# Patient Record
Sex: MALE | Race: WHITE | NOT HISPANIC OR LATINO | Employment: FULL TIME | URBAN - METROPOLITAN AREA
[De-identification: names, ages, dates, MRNs, and addresses within clinical notes are randomized per-mention and may not be internally consistent; named-entity substitution may affect disease eponyms.]

---

## 2018-04-17 ENCOUNTER — OFFICE VISIT (OUTPATIENT)
Dept: URGENT CARE | Facility: CLINIC | Age: 33
End: 2018-04-17
Payer: COMMERCIAL

## 2018-04-17 VITALS
WEIGHT: 196 LBS | OXYGEN SATURATION: 96 % | DIASTOLIC BLOOD PRESSURE: 70 MMHG | SYSTOLIC BLOOD PRESSURE: 110 MMHG | RESPIRATION RATE: 14 BRPM | BODY MASS INDEX: 28.06 KG/M2 | HEIGHT: 70 IN | TEMPERATURE: 98.4 F | HEART RATE: 85 BPM

## 2018-04-17 DIAGNOSIS — J98.8 RTI (RESPIRATORY TRACT INFECTION): ICD-10-CM

## 2018-04-17 DIAGNOSIS — J03.90 EXUDATIVE TONSILLITIS: ICD-10-CM

## 2018-04-17 PROCEDURE — 99204 OFFICE O/P NEW MOD 45 MIN: CPT | Performed by: FAMILY MEDICINE

## 2018-04-17 RX ORDER — DEXAMETHASONE SODIUM PHOSPHATE 4 MG/ML
8 INJECTION, SOLUTION INTRA-ARTICULAR; INTRALESIONAL; INTRAMUSCULAR; INTRAVENOUS; SOFT TISSUE ONCE
Status: COMPLETED | OUTPATIENT
Start: 2018-04-17 | End: 2018-04-17

## 2018-04-17 RX ORDER — CEFDINIR 300 MG/1
300 CAPSULE ORAL EVERY 12 HOURS
Qty: 14 CAP | Refills: 0 | Status: SHIPPED | OUTPATIENT
Start: 2018-04-17 | End: 2018-04-24

## 2018-04-17 RX ORDER — PROMETHAZINE HYDROCHLORIDE AND PHENYLEPHRINE HYDROCHLORIDE 6.25; 5 MG/5ML; MG/5ML
SYRUP ORAL
Qty: 120 ML | Refills: 1 | Status: SHIPPED | OUTPATIENT
Start: 2018-04-17 | End: 2019-06-18

## 2018-04-17 RX ORDER — IBUPROFEN 800 MG/1
800 TABLET ORAL EVERY 8 HOURS PRN
Qty: 20 TAB | Refills: 3 | Status: SHIPPED | OUTPATIENT
Start: 2018-04-17 | End: 2019-06-18

## 2018-04-17 RX ADMIN — DEXAMETHASONE SODIUM PHOSPHATE 8 MG: 4 INJECTION, SOLUTION INTRA-ARTICULAR; INTRALESIONAL; INTRAMUSCULAR; INTRAVENOUS; SOFT TISSUE at 18:33

## 2018-04-17 ASSESSMENT — ENCOUNTER SYMPTOMS
DIZZINESS: 0
ORTHOPNEA: 0
FEVER: 0
SORE THROAT: 1
FOCAL WEAKNESS: 0
HEMOPTYSIS: 0
SHORTNESS OF BREATH: 0
CHILLS: 0
COUGH: 1

## 2018-04-17 ASSESSMENT — PATIENT HEALTH QUESTIONNAIRE - PHQ9: CLINICAL INTERPRETATION OF PHQ2 SCORE: 0

## 2018-04-17 NOTE — LETTER
April 17, 2018         Patient: Dwayne Jackson   YOB: 1985   Date of Visit: 4/17/2018           To Whom it May Concern:    Dwayne Jackson was seen in my clinic on 4/17/2018. He may return to work in 2-3 days.    If you have any questions or concerns, please don't hesitate to call.        Sincerely,           Kota Estrada M.D.  Electronically Signed

## 2018-04-18 NOTE — PROGRESS NOTES
"Subjective:      Dwayne Jackson is a 32 y.o. male who presents with Pharyngitis; Fever; and Cough (x2 weeks)    Chief Complaint   Patient presents with   • Pharyngitis   • Fever   • Cough     x2 weeks        - This is a very pleasant 32 y.o. male with complaints of cough/sinus and sore throat x 2wks, no NVFC           ALLERGIES:  Patient has no allergy information on record.     PMH:  History reviewed. No pertinent past medical history.     MEDS:    Current Outpatient Prescriptions:   •  cefdinir (OMNICEF) 300 MG Cap, Take 1 Cap by mouth every 12 hours for 7 days., Disp: 14 Cap, Rfl: 0  •  Promethazine-Phenylephrine 6.25-5 MG/5ML Syrup, 5ml q8hrd prn, Disp: 120 mL, Rfl: 1  •  ibuprofen (MOTRIN) 800 MG Tab, Take 1 Tab by mouth every 8 hours as needed., Disp: 20 Tab, Rfl: 3    Current Facility-Administered Medications:   •  dexamethasone (DECADRON) injection 8 mg, 8 mg, Intramuscular, Once, Kota Estrada M.D.    ** I have documented what I find to be significant in regards to past medical, social, family and surgical history  in my HPI or under PMH/PSH/FH review section, otherwise it is contributory **             HPI    Review of Systems   Constitutional: Negative for chills and fever.   HENT: Positive for congestion and sore throat.    Respiratory: Positive for cough. Negative for hemoptysis and shortness of breath.    Cardiovascular: Negative for chest pain and orthopnea.   Neurological: Negative for dizziness and focal weakness.          Objective:     /70   Pulse 85   Temp 36.9 °C (98.4 °F)   Resp 14   Ht 1.778 m (5' 10\")   Wt 88.9 kg (196 lb)   SpO2 96%   BMI 28.12 kg/m²      Physical Exam   Constitutional: He appears well-developed. No distress.   HENT:   Head: Normocephalic and atraumatic.   Mouth/Throat: Oropharyngeal exudate present.   Eyes: Conjunctivae are normal.   Neck: Neck supple.   Cardiovascular: Regular rhythm.    No murmur heard.  Pulmonary/Chest: Effort normal and breath " sounds normal. No respiratory distress.   Lymphadenopathy:     He has cervical adenopathy.   Neurological: He is alert. He exhibits normal muscle tone.   Skin: Skin is warm and dry.   Psychiatric: He has a normal mood and affect. Judgment normal.   Nursing note and vitals reviewed.              Assessment/Plan:         1. RTI (respiratory tract infection)  Promethazine-Phenylephrine 6.25-5 MG/5ML Syrup   2. Exudative tonsillitis  dexamethasone (DECADRON) injection 8 mg    cefdinir (OMNICEF) 300 MG Cap    ibuprofen (MOTRIN) 800 MG Tab             Dx & d/c instructions discussed w/ patient and/or family members. Follow up w/ Prvt Dr or here in 3-4 days if not getting better, sooner if needed,  ER if worse and UC/PCP unavailable.        Possible side effects (i.e. Rash, GI upset/constipation, sedation, elevation of BP or sugars) of any medications given discussed.

## 2019-06-18 ENCOUNTER — OFFICE VISIT (OUTPATIENT)
Dept: URGENT CARE | Facility: CLINIC | Age: 34
End: 2019-06-18
Payer: COMMERCIAL

## 2019-06-18 ENCOUNTER — HOSPITAL ENCOUNTER (OUTPATIENT)
Facility: MEDICAL CENTER | Age: 34
End: 2019-06-18
Attending: FAMILY MEDICINE
Payer: COMMERCIAL

## 2019-06-18 VITALS
SYSTOLIC BLOOD PRESSURE: 118 MMHG | HEIGHT: 68 IN | HEART RATE: 78 BPM | TEMPERATURE: 97.6 F | WEIGHT: 186 LBS | BODY MASS INDEX: 28.19 KG/M2 | OXYGEN SATURATION: 96 % | DIASTOLIC BLOOD PRESSURE: 80 MMHG | RESPIRATION RATE: 18 BRPM

## 2019-06-18 DIAGNOSIS — N34.2 URETHRITIS: ICD-10-CM

## 2019-06-18 PROCEDURE — 96372 THER/PROPH/DIAG INJ SC/IM: CPT | Performed by: FAMILY MEDICINE

## 2019-06-18 PROCEDURE — 99214 OFFICE O/P EST MOD 30 MIN: CPT | Mod: 25 | Performed by: FAMILY MEDICINE

## 2019-06-18 PROCEDURE — 87491 CHLMYD TRACH DNA AMP PROBE: CPT

## 2019-06-18 PROCEDURE — 87591 N.GONORRHOEAE DNA AMP PROB: CPT

## 2019-06-18 RX ORDER — PROMETHAZINE HYDROCHLORIDE AND PHENYLEPHRINE HYDROCHLORIDE 6.25; 5 MG/5ML; MG/5ML
SYRUP ORAL
Qty: 120 ML | Refills: 1 | Status: CANCELLED | OUTPATIENT
Start: 2019-06-18

## 2019-06-18 RX ORDER — IBUPROFEN 800 MG/1
800 TABLET ORAL EVERY 8 HOURS PRN
Qty: 20 TAB | Refills: 3 | Status: CANCELLED | OUTPATIENT
Start: 2019-06-18

## 2019-06-18 RX ORDER — AZITHROMYCIN 500 MG/1
1000 TABLET, FILM COATED ORAL ONCE
Qty: 2 TAB | Refills: 0 | Status: SHIPPED | OUTPATIENT
Start: 2019-06-18 | End: 2019-06-18

## 2019-06-19 DIAGNOSIS — N34.2 URETHRITIS: ICD-10-CM

## 2019-06-19 LAB
C TRACH DNA SPEC QL NAA+PROBE: NEGATIVE
N GONORRHOEA DNA SPEC QL NAA+PROBE: NEGATIVE
SPECIMEN SOURCE: NORMAL

## 2019-06-19 NOTE — PROGRESS NOTES
"Subjective:      Dwayne Jackson is a 33 y.o. male who presents with Exposure to STD      - This is a pleasant and non toxic appearing 33 y.o. male with c/o slight dysuria x 1 wk. No penile DC.           ALLERGIES:  Patient has no known allergies.     PMH:  History reviewed. No pertinent past medical history.     PSH:  History reviewed. No pertinent surgical history.    MEDS:    Current Outpatient Prescriptions:   •  azithromycin (ZITHROMAX) 500 MG tablet, Take 2 Tabs by mouth Once for 1 dose., Disp: 2 Tab, Rfl: 0    Current Facility-Administered Medications:   •  cefTRIAXone (ROCEPHIN) 500 mg, lidocaine (XYLOCAINE) 1 % 1.8 mL for IM use, 500 mg, Intramuscular, Once, Kota Estrada M.D.    ** I have documented what I find to be significant in regards to past medical, social, family and surgical history  in my HPI or under PMH/PSH/FH review section, otherwise it is contributory **           HPI    Review of Systems   Musculoskeletal: Positive for joint pain.   All other systems reviewed and are negative.         Objective:     /80   Pulse 78   Temp 36.4 °C (97.6 °F) (Temporal)   Resp 18   Ht 1.727 m (5' 8\")   Wt 84.4 kg (186 lb)   SpO2 96%   BMI 28.28 kg/m²      Physical Exam   Constitutional: He appears well-developed. No distress.   HENT:   Head: Normocephalic and atraumatic.   Cardiovascular: Regular rhythm.    No murmur heard.  Pulmonary/Chest: Effort normal. No respiratory distress.   Neurological: He is alert. He exhibits normal muscle tone.   Skin: Skin is warm and dry.   Psychiatric: He has a normal mood and affect. Judgment normal.   Nursing note and vitals reviewed.              Assessment/Plan:         1. Urethritis  cefTRIAXone (ROCEPHIN) 500 mg, lidocaine (XYLOCAINE) 1 % 1.8 mL for IM use    azithromycin (ZITHROMAX) 500 MG tablet    CHLAMYDIA/GC PCR URINE OR SWAB             Dx & d/c instructions discussed w/ patient and/or family members.     Follow up with PCP (or here if PCP " unavailable) in 2-3 days if symptoms not improving, ER if feeling/getting worse.    Any realistic and/or common medication side effects that may have been given today(i.e. Rash, GI upset/constipation, sedation, elevation of BP or blood sugars) reviewed.     Patient left in stable condition

## 2021-04-12 ENCOUNTER — NURSE TRIAGE (OUTPATIENT)
Dept: HEALTH INFORMATION MANAGEMENT | Facility: OTHER | Age: 36
End: 2021-04-12

## 2021-04-12 NOTE — TELEPHONE ENCOUNTER
Regarding: Next course of action / establish with Alena Barillas   ----- Message from Rhonda Ahn sent at 4/12/2021  2:16 PM PDT -----  Dizziness with nausea post vaccination. Spouse Simona Driscoll does not have access to chart     NP / PRIOR PCP NONE / NO PAIN MEDS / DIZZY SPELLS WITH NAUSEA / POSSIBLE HIGH BLOOD PRESSURE / ANTHEM   [phone]

## 2021-04-12 NOTE — TELEPHONE ENCOUNTER
"Pt has had dizziness for a couple of months now but has been increasing in frequency and severity for the last week. Pt feels as though he is spinning and most of the time needs to hold on to something to be able to walk. Advised UC. Appt scheduled. Scheduled NP appt for PCP as well.    Reason for Disposition  • SEVERE dizziness (e.g., unable to stand, requires support to walk, feels like passing out now)    Additional Information  • Negative: Shock suspected (e.g., cold/pale/clammy skin, too weak to stand, low BP, rapid pulse)  • Negative: Difficult to awaken or acting confused (e.g., disoriented, slurred speech)  • Negative: Fainted, and still feels dizzy afterwards  • Negative: SEVERE difficulty breathing (e.g., struggling for each breath, speaks in single words)  • Negative: Overdose (accidental or intentional) of medications  • Negative: New neurologic deficit that is present now: * Weakness of the face, arm, or leg on one side of the body * Numbness of the face, arm, or leg on one side of the body * Loss of speech or garbled speech  • Negative: Heart beating < 50 beats per minute OR > 140 beats per minute  • Negative: Sounds like a life-threatening emergency to the triager  • Negative: Chest pain  • Negative: Rectal bleeding, bloody stool, or tarry-black stool  • Negative: Vomiting is the main symptom  • Negative: Diarrhea is the main symptom  • Negative: Headache is the main symptom  • Negative: Heat exhaustion suspected (i.e., dehydration from heat exposure)  • Negative: Patient states that he/she is having an anxiety/panic attack    Answer Assessment - Initial Assessment Questions  1. DESCRIPTION: \"Describe your dizziness.\"      Dizziness, when you are drunk and have the spins  2. LIGHTHEADED: \"Do you feel lightheaded?\" (e.g., somewhat faint, woozy, weak upon standing)      spinning  3. VERTIGO: \"Do you feel like either you or the room is spinning or tilting?\" (i.e. vertigo)      yes  4. SEVERITY: \"How bad " "is it?\"  \"Do you feel like you are going to faint?\" \"Can you stand and walk?\"    - MILD - walking normally    - MODERATE - interferes with normal activities (e.g., work, school)     - SEVERE - unable to stand, requires support to walk, feels like passing out now.       Moderate to severe  5. ONSET:  \"When did the dizziness begin?\"      Two months  6. AGGRAVATING FACTORS: \"Does anything make it worse?\" (e.g., standing, change in head position)      unknown  7. HEART RATE: \"Can you tell me your heart rate?\" \"How many beats in 15 seconds?\"  (Note: not all patients can do this)        no  8. CAUSE: \"What do you think is causing the dizziness?\"      unknown  9. RECURRENT SYMPTOM: \"Have you had dizziness before?\" If so, ask: \"When was the last time?\" \"What happened that time?\"      no  10. OTHER SYMPTOMS: \"Do you have any other symptoms?\" (e.g., fever, chest pain, vomiting, diarrhea, bleeding)        Nausea with these episodes  11. PREGNANCY: \"Is there any chance you are pregnant?\" \"When was your last menstrual period?\"        no    Protocols used: DIZZINESS-A-OH    "

## 2021-04-13 ENCOUNTER — APPOINTMENT (OUTPATIENT)
Dept: RADIOLOGY | Facility: MEDICAL CENTER | Age: 36
End: 2021-04-13
Attending: EMERGENCY MEDICINE
Payer: COMMERCIAL

## 2021-04-13 ENCOUNTER — HOSPITAL ENCOUNTER (EMERGENCY)
Facility: MEDICAL CENTER | Age: 36
End: 2021-04-13
Attending: EMERGENCY MEDICINE
Payer: COMMERCIAL

## 2021-04-13 ENCOUNTER — OFFICE VISIT (OUTPATIENT)
Dept: URGENT CARE | Facility: CLINIC | Age: 36
End: 2021-04-13
Payer: COMMERCIAL

## 2021-04-13 VITALS
DIASTOLIC BLOOD PRESSURE: 60 MMHG | HEIGHT: 70 IN | RESPIRATION RATE: 16 BRPM | TEMPERATURE: 98 F | SYSTOLIC BLOOD PRESSURE: 108 MMHG | OXYGEN SATURATION: 94 % | BODY MASS INDEX: 30.06 KG/M2 | HEART RATE: 90 BPM | WEIGHT: 210 LBS

## 2021-04-13 VITALS
BODY MASS INDEX: 30.52 KG/M2 | SYSTOLIC BLOOD PRESSURE: 112 MMHG | OXYGEN SATURATION: 96 % | DIASTOLIC BLOOD PRESSURE: 62 MMHG | HEART RATE: 71 BPM | RESPIRATION RATE: 15 BRPM | TEMPERATURE: 97 F | WEIGHT: 213.19 LBS | HEIGHT: 70 IN

## 2021-04-13 DIAGNOSIS — R42 DIZZINESS: ICD-10-CM

## 2021-04-13 DIAGNOSIS — R42 VERTIGO: ICD-10-CM

## 2021-04-13 DIAGNOSIS — H53.9 VISION DISTURBANCE: ICD-10-CM

## 2021-04-13 DIAGNOSIS — J34.9 SINUS DISEASE: ICD-10-CM

## 2021-04-13 DIAGNOSIS — Z87.898 HISTORY OF CHEST PAIN: ICD-10-CM

## 2021-04-13 DIAGNOSIS — R26.89 IMBALANCE: ICD-10-CM

## 2021-04-13 LAB
ALBUMIN SERPL BCP-MCNC: 4.6 G/DL (ref 3.2–4.9)
ALBUMIN/GLOB SERPL: 1.4 G/DL
ALP SERPL-CCNC: 87 U/L (ref 30–99)
ALT SERPL-CCNC: 105 U/L (ref 2–50)
ANION GAP SERPL CALC-SCNC: 13 MMOL/L (ref 7–16)
AST SERPL-CCNC: 51 U/L (ref 12–45)
BASOPHILS # BLD AUTO: 0.4 % (ref 0–1.8)
BASOPHILS # BLD: 0.03 K/UL (ref 0–0.12)
BILIRUB SERPL-MCNC: 0.5 MG/DL (ref 0.1–1.5)
BUN SERPL-MCNC: 15 MG/DL (ref 8–22)
CALCIUM SERPL-MCNC: 9.7 MG/DL (ref 8.4–10.2)
CHLORIDE SERPL-SCNC: 102 MMOL/L (ref 96–112)
CO2 SERPL-SCNC: 22 MMOL/L (ref 20–33)
CREAT SERPL-MCNC: 0.72 MG/DL (ref 0.5–1.4)
EKG IMPRESSION: NORMAL
EOSINOPHIL # BLD AUTO: 0.08 K/UL (ref 0–0.51)
EOSINOPHIL NFR BLD: 1 % (ref 0–6.9)
ERYTHROCYTE [DISTWIDTH] IN BLOOD BY AUTOMATED COUNT: 38.4 FL (ref 35.9–50)
GLOBULIN SER CALC-MCNC: 3.2 G/DL (ref 1.9–3.5)
GLUCOSE SERPL-MCNC: 132 MG/DL (ref 65–99)
HCT VFR BLD AUTO: 48.5 % (ref 42–52)
HGB BLD-MCNC: 17.2 G/DL (ref 14–18)
IMM GRANULOCYTES # BLD AUTO: 0.03 K/UL (ref 0–0.11)
IMM GRANULOCYTES NFR BLD AUTO: 0.4 % (ref 0–0.9)
LYMPHOCYTES # BLD AUTO: 2.42 K/UL (ref 1–4.8)
LYMPHOCYTES NFR BLD: 30.7 % (ref 22–41)
MCH RBC QN AUTO: 31.7 PG (ref 27–33)
MCHC RBC AUTO-ENTMCNC: 35.5 G/DL (ref 33.7–35.3)
MCV RBC AUTO: 89.3 FL (ref 81.4–97.8)
MONOCYTES # BLD AUTO: 0.5 K/UL (ref 0–0.85)
MONOCYTES NFR BLD AUTO: 6.4 % (ref 0–13.4)
NEUTROPHILS # BLD AUTO: 4.81 K/UL (ref 1.82–7.42)
NEUTROPHILS NFR BLD: 61.1 % (ref 44–72)
NRBC # BLD AUTO: 0 K/UL
NRBC BLD-RTO: 0 /100 WBC
PLATELET # BLD AUTO: 274 K/UL (ref 164–446)
PMV BLD AUTO: 8.7 FL (ref 9–12.9)
POTASSIUM SERPL-SCNC: 3.9 MMOL/L (ref 3.6–5.5)
PROT SERPL-MCNC: 7.8 G/DL (ref 6–8.2)
RBC # BLD AUTO: 5.43 M/UL (ref 4.7–6.1)
SODIUM SERPL-SCNC: 137 MMOL/L (ref 135–145)
TROPONIN T SERPL-MCNC: <6 NG/L (ref 6–19)
WBC # BLD AUTO: 7.9 K/UL (ref 4.8–10.8)

## 2021-04-13 PROCEDURE — 80053 COMPREHEN METABOLIC PANEL: CPT

## 2021-04-13 PROCEDURE — 99284 EMERGENCY DEPT VISIT MOD MDM: CPT

## 2021-04-13 PROCEDURE — 85025 COMPLETE CBC W/AUTO DIFF WBC: CPT

## 2021-04-13 PROCEDURE — 70498 CT ANGIOGRAPHY NECK: CPT

## 2021-04-13 PROCEDURE — 700117 HCHG RX CONTRAST REV CODE 255: Performed by: EMERGENCY MEDICINE

## 2021-04-13 PROCEDURE — 84484 ASSAY OF TROPONIN QUANT: CPT

## 2021-04-13 PROCEDURE — 99215 OFFICE O/P EST HI 40 MIN: CPT | Performed by: PHYSICIAN ASSISTANT

## 2021-04-13 PROCEDURE — 71045 X-RAY EXAM CHEST 1 VIEW: CPT

## 2021-04-13 PROCEDURE — 93005 ELECTROCARDIOGRAM TRACING: CPT | Performed by: EMERGENCY MEDICINE

## 2021-04-13 PROCEDURE — 70496 CT ANGIOGRAPHY HEAD: CPT

## 2021-04-13 RX ORDER — MECLIZINE HYDROCHLORIDE 25 MG/1
25 TABLET ORAL 3 TIMES DAILY PRN
Qty: 30 TABLET | Refills: 0 | Status: SHIPPED | OUTPATIENT
Start: 2021-04-13 | End: 2021-05-11

## 2021-04-13 RX ORDER — IBUPROFEN 200 MG
400-600 TABLET ORAL EVERY 6 HOURS PRN
COMMUNITY

## 2021-04-13 RX ORDER — FEXOFENADINE HCL AND PSEUDOEPHEDRINE HCI 60; 120 MG/1; MG/1
1 TABLET, EXTENDED RELEASE ORAL DAILY
Qty: 10 TABLET | Refills: 0 | Status: SHIPPED | OUTPATIENT
Start: 2021-04-13 | End: 2021-04-27

## 2021-04-13 RX ORDER — ASPIRIN 325 MG
650 TABLET ORAL EVERY 6 HOURS PRN
Status: SHIPPED | COMMUNITY
End: 2022-07-06

## 2021-04-13 RX ADMIN — IOHEXOL 100 ML: 350 INJECTION, SOLUTION INTRAVENOUS at 11:03

## 2021-04-13 ASSESSMENT — ENCOUNTER SYMPTOMS
EYE DISCHARGE: 0
WHEEZING: 0
NECK PAIN: 1
VOMITING: 1
EYE REDNESS: 0
COUGH: 0
VISUAL CHANGE: 1
CHANGE IN BOWEL HABIT: 0
FEVER: 0
DIZZINESS: 1
NAUSEA: 1
FATIGUE: 1
BLURRED VISION: 1
SENSORY CHANGE: 1
ABDOMINAL PAIN: 0
TINGLING: 1

## 2021-04-13 NOTE — PROGRESS NOTES
"Subjective:      Dwayne Jackson is a 35 y.o. male who presents with Dizziness (HBP, numbness on Lt hand:fingertips, legs and face, hard time retaining information, dizziness started 2 weks after Covid vaccine: 01/26/21)            Patient is a 35-year-old male who presents to urgent care with intermittent episodes of room spinning, difficulty walking, numbness and tingling into the fingertips, face since beginning of February.  Patient has gradually become more concerned as his episodes of room spinning have progressively gotten worse in particular over the last week now patient is having to hold onto objects to walk as he feels unstable.  He does report 1-2 severe episodes throughout the day where he feels associated nausea and lightheadedness almost as though he is going to pass out.  During some of these episodes he does have numbness and tingling into his face and hands.  Also as such he reports \"tunnel vision\" and will often have associated visual changes for several minutes.  He is uncertain various triggers as he does report that getting stressed for activity worsen symptoms however he will even have episode while lying still in bed.  Finally patient does report intermittent episodes of chest pain to the left side of the chest sometimes radiation to the left side of the neck-he does report stress will trigger this.  He denies any current chest pain at this time although does report intermittent episodes over the last few weeks.  Denies specific headache and is uncertain if he has 1 with these episodes.  Patient is with his wife today who also provides history.    Dizziness  This is a new problem. The current episode started more than 1 month ago. The problem occurs daily. The problem has been waxing and waning. Associated symptoms include chest pain, fatigue, nausea, neck pain, a visual change and vomiting. Pertinent negatives include no abdominal pain, change in bowel habit, coughing or fever. " "Exacerbated by: As above.  Treatments tried: ASA. The treatment provided mild relief.       Review of Systems   Constitutional: Positive for fatigue and malaise/fatigue. Negative for fever.   HENT: Positive for tinnitus.    Eyes: Positive for blurred vision. Negative for discharge and redness.   Respiratory: Negative for cough and wheezing.    Cardiovascular: Positive for chest pain.   Gastrointestinal: Positive for nausea and vomiting. Negative for abdominal pain and change in bowel habit.   Genitourinary: Negative for dysuria.   Musculoskeletal: Positive for neck pain.   Neurological: Positive for dizziness, tingling and sensory change.   All other systems reviewed and are negative.         Objective:     /60 (BP Location: Left arm, Patient Position: Sitting, BP Cuff Size: Adult long)   Pulse 90   Temp 36.7 °C (98 °F) (Temporal)   Resp 16   Ht 1.778 m (5' 10\")   Wt 95.3 kg (210 lb)   SpO2 94%   BMI 30.13 kg/m²    PMH:  has no past medical history on file.  MEDS: Reviewed .   ALLERGIES: No Known Allergies  SURGHX: History reviewed. No pertinent surgical history.  SOCHX:  reports that he has never smoked. He has never used smokeless tobacco. He reports current alcohol use. He reports current drug use. Drug: Marijuana.  FH: Family history was reviewed, no pertinent findings to report    Physical Exam  Vitals reviewed.   Constitutional:       General: He is not in acute distress.     Appearance: He is well-developed.   HENT:      Head: Normocephalic and atraumatic.      Right Ear: External ear normal.      Left Ear: External ear normal.      Mouth/Throat:      Pharynx: No oropharyngeal exudate.   Eyes:      General: No visual field deficit.     Conjunctiva/sclera: Conjunctivae normal.      Pupils: Pupils are equal, round, and reactive to light.   Neck:      Trachea: No tracheal deviation.   Cardiovascular:      Rate and Rhythm: Normal rate and regular rhythm.      Heart sounds: No murmur.   Pulmonary:     "  Effort: Pulmonary effort is normal. No respiratory distress.      Breath sounds: Normal breath sounds.   Musculoskeletal:         General: Normal range of motion.      Cervical back: Normal range of motion and neck supple.   Skin:     General: Skin is warm.      Findings: No rash.   Neurological:      General: No focal deficit present.      Mental Status: He is alert and oriented to person, place, and time.      GCS: GCS eye subscore is 4. GCS verbal subscore is 5. GCS motor subscore is 6.      Cranial Nerves: No cranial nerve deficit.      Gait: Gait abnormal.      Comments: Slow finger to nose.   Unsteady with tandem walk.    Psychiatric:         Behavior: Behavior normal.         Thought Content: Thought content normal.         Judgment: Judgment normal.                 Assessment/Plan:        1. Imbalance  2. Vertigo  3. History of chest pain  4. Vision disturbance    Patient has notable red flag symptoms with noted imbalance on exam unable to walk straight line and is unsteady with Romberg's.  Patient is having episodes with activity but also at rest does not appear to be consistent with positional vertigo.  Furthermore patient has had a sensation changes along with visual disturbances.  Also intermittent episodes of chest pain.  Both symptoms have been going on for several weeks I do feel that patient needs higher level of care and prompt work-up at this time.  Patient is agreeable to go to the emergency room (uncertain which emergency room he will present to however is agreeable to go) with his wife who will drive the patient for further evaluation and work-up.  Patient was stable throughout the duration of his care-patient currently denies any chest pain.    Please note that this dictation was created using voice recognition software. I have made every reasonable attempt to correct obvious errors, but I expect that there are errors of grammar and possibly content that I did not discover before finalizing  the note.

## 2021-04-13 NOTE — ED NOTES
Discharge instructions provided. PIV removed.  Pt verbalized the understanding of discharge instructions to follow up with PCP and to return to ER if condition worsens.  Pt ambulated out of ER without difficulty.

## 2021-04-13 NOTE — ED TRIAGE NOTES
"Chief Complaint   Patient presents with   • Dizziness     started couple months ago after second Covid Vaccine, reports is now \"everyday\"   • Neck Pain     \"has been going on for a while\"     /86   Pulse 80   Temp 36.7 °C (98 °F) (Temporal)   Resp 15   Ht 1.778 m (5' 10\")   Wt 96.7 kg (213 lb 3 oz)   SpO2 96%   BMI 30.59 kg/m²     Pt ambulated to ED w/ family member, c/o intermittent dizziness for several months, reports has been getting progressively more frequent over the last couple of days.  Pt states has been working \"nonstop\".  Pt states also has intermittent neck pain separate from the dizziness.    Pt denies c/o CP, abd pain or back pain.    Covid Screen Negative.  Pt has received Covid Vaccines x 2.   "

## 2021-04-13 NOTE — ED NOTES
Med rec updated and complete  Allergies reviewed  Interviewed pt with wife at bedside with permission from pt.  Pt reports no prescription medications.  Pt reports no antibiotics in the last 2 weeks

## 2021-04-13 NOTE — ED PROVIDER NOTES
"ED Provider Note    ED Provider Note    Primary care provider: MARTI Sy  Means of arrival: Walk-in  History obtained from: Patient    CHIEF COMPLAINT  Chief Complaint   Patient presents with   • Dizziness     started couple months ago after second Covid Vaccine, reports is now \"everyday\"   • Neck Pain     \"has been going on for a while\"     Seen at 9:56 AM.   HPI  Dwayne Jackson is a 35 y.o. male who presents to the Emergency Department for dizziness and vertigo. For the past few months he has had intermittent sensation that the room is spinning, but he is off balance, he also reports intermittent tunnel vision, these occur sometimes when he is walking or going upstairs. They do not appear to be modified with head movement. He feels that they have progressively getting worse. 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    On review of systems he notes intermittent blurred vision, intermittent headaches, left-sided neck pain, intermittent ringing of the ears, intermittent chest pain intermittent shortness of breath intermittent dyspnea on exertion, intermittent tingling of the fingers feet and face. He also endorses some anxiety and stress.    REVIEW OF SYSTEMS  See HPI,   Remainder of ROS negative.     PAST MEDICAL HISTORY   has a past medical history of Patient denies medical problems.    SURGICAL HISTORY   has a past surgical history that includes hernia repair.    SOCIAL HISTORY  Social History     Tobacco Use   • Smoking status: Never Smoker   • Smokeless tobacco: Never Used   Substance Use Topics   • Alcohol use: Yes   • Drug use: Yes     Types: Marijuana     Comment: Marijuana      Social History     Substance and Sexual Activity   Drug Use Yes   • Types: Marijuana    Comment: Marijuana       FAMILY HISTORY  History reviewed. No pertinent family history.    CURRENT MEDICATIONS  Reviewed.  See Encounter " "Summary.     ALLERGIES  Allergies   Allergen Reactions   • Other Food Itching     Mushrooms, pt reports that he gets itchy        PHYSICAL EXAM  VITAL SIGNS: /62   Pulse 71   Temp 36.1 °C (97 °F) (Temporal)   Resp 15   Ht 1.778 m (5' 10\")   Wt 96.7 kg (213 lb 3 oz)   SpO2 96%   BMI 30.59 kg/m²   Constitutional: Awake, alert in no apparent distress.  HENT: Normocephalic, Bilateral external ears normal. Nose normal. No mastoid tenderness, tympanic membranes normal.  Eyes: Conjunctiva normal, non-icteric, EOMI.    Thorax & Lungs: Easy unlabored respirations, Clear to ascultation bilaterally.  Cardiovascular: Regular rate, Regular rhythm, No murmurs, rubs or gallops. Bilateral pulses symmetrical.   Abdomen:  Soft, nontender, nondistended, normal active bowel sounds.   :    Skin: Visualized skin is  Dry, No erythema, No rash.   Musculoskeletal:   No cyanosis, clubbing or edema. No leg asymmetry.   Neurologic: Alert, Grossly non-focal. Cranial nerves II through XII intact, no nystagmus, normal speech, finger-nose intact, no dysmetria, no drift, heel-to-shin intact. Sensation intact to light touch throughout.  Psychiatric: Normal affect, Normal mood  Lymphatic:  No cervical LAD    EKG   12 lead Interpreted by me  Rhythm:  Normal sinus rhythm   Rate: 77  Axis: normal  Ectopy: none  Conduction: normal  ST Segments: no acute change  T Waves: Nonspecific flattening, no prior EKG for comparison.   Clinical Impression: Nonspecific T wave changes, otherwise unremarkable EKG without acute ischemic changes.    RADIOLOGY  CT-CTA NECK WITH & W/O-POST PROCESSING   Final Result      No stenosis or dissection is seen in the carotid or vertebral arteries bilaterally.      Enlarged jugulodigastric and left submandibular lymph nodes are nonspecific and may be infectious/inflammatory. Malignancy is not excluded.      Periapical lucency surrounding the second left maxillary molar with dental caries.      Left maxillary " paranasal sinus disease.      CT-CTA HEAD WITH & W/O-POST PROCESS   Final Result      No thrombosis is seen within the [place].      No acute intracranial abnormality is seen.      Left maxillary paranasal sinus disease.      DX-CHEST-PORTABLE (1 VIEW)   Final Result      No radiographic evidence of acute cardiopulmonary process.            COURSE & MEDICAL DECISION MAKING  Pertinent Labs & Imaging studies reviewed. (See chart for details)    Differential diagnoses include but are not limited to: Positional vertigo, dizziness/lightheadedness, vertebral or carotid artery thrombosis, VBI, Ménière's, sinus disease, anxiety    9:56 AM - Medical record reviewed, patient seen and examined at bedside.    Decision Making:  This is a pleasant 35 y.o. year old male who presents with dizziness and/or vertigo.  The patient is pan positive on review of systems.  Neurologic exam is normal without nystagmus, therefore it hints exam would not be diagnostic.  Differential as above.  CTA of the head and neck does not show any acute process, there is some evidence of sinus disease as well as some reactive lymphadenopathy.  I will place the patient on fexofenadine D for the sinus.  I do not feel that antibiotics are indicated.  I will also treat him with meclizine for the vertigo.  Overall the history is not entirely clear as he has pan positive view of systems, therefore it is difficult to elicit exactly the source.  EKG does not show any conduction abnormalities, specifically no shortened GA, delta waves, Brugada syndrome etc.  Troponin is undetectable as well.    At this time it does not appear to be cardiogenic and very unlikely to be cerebellar.  I suspect this is due to positional vertigo versus some dehydration.  I recommend follow-up with his primary care physician, I will try these medications and he does have follow-up in 2 weeks to reassess.  If he has persistent symptoms, outpatient MRI may be of some  benefit.    Discharge Medications:  Discharge Medication List as of 4/13/2021 11:44 AM      START taking these medications    Details   meclizine (ANTIVERT) 25 MG Tab Take 1 tablet by mouth 3 times a day as needed.For vertigoDisp-30 tablet, R-0, Normal      fexofenadine-pseudoephedrine (ALLEGRA-D)  MG per tablet Take 1 tablet by mouth every day.For sinus diseaseDisp-10 tablet, R-0, Normal             The patient was discharged home (see d/c instructions) was told to return immediately for any signs or symptoms listed, or any worsening at all.  The patient verbally agreed to the discharge precautions and follow-up plan which is documented in EPIC.        FINAL IMPRESSION  1. Dizziness    2. Vertigo    3. Sinus disease

## 2021-04-26 ENCOUNTER — APPOINTMENT (OUTPATIENT)
Dept: MEDICAL GROUP | Facility: MEDICAL CENTER | Age: 36
End: 2021-04-26
Payer: COMMERCIAL

## 2021-04-27 ENCOUNTER — OFFICE VISIT (OUTPATIENT)
Dept: MEDICAL GROUP | Facility: MEDICAL CENTER | Age: 36
End: 2021-04-27
Payer: COMMERCIAL

## 2021-04-27 VITALS
DIASTOLIC BLOOD PRESSURE: 95 MMHG | OXYGEN SATURATION: 95 % | HEART RATE: 90 BPM | TEMPERATURE: 98 F | SYSTOLIC BLOOD PRESSURE: 155 MMHG | HEIGHT: 69 IN | WEIGHT: 209.11 LBS | RESPIRATION RATE: 18 BRPM | BODY MASS INDEX: 30.97 KG/M2

## 2021-04-27 DIAGNOSIS — Z13.21 ENCOUNTER FOR VITAMIN DEFICIENCY SCREENING: ICD-10-CM

## 2021-04-27 DIAGNOSIS — I10 ESSENTIAL HYPERTENSION: ICD-10-CM

## 2021-04-27 DIAGNOSIS — E66.9 OBESITY (BMI 30-39.9): ICD-10-CM

## 2021-04-27 DIAGNOSIS — Z13.1 SCREENING FOR DIABETES MELLITUS: ICD-10-CM

## 2021-04-27 DIAGNOSIS — Z13.0 SCREENING FOR DEFICIENCY ANEMIA: ICD-10-CM

## 2021-04-27 DIAGNOSIS — R42 DIZZINESS: ICD-10-CM

## 2021-04-27 DIAGNOSIS — Z13.6 ENCOUNTER FOR LIPID SCREENING FOR CARDIOVASCULAR DISEASE: ICD-10-CM

## 2021-04-27 DIAGNOSIS — Z23 IMMUNIZATION DUE: ICD-10-CM

## 2021-04-27 DIAGNOSIS — R07.9 CHEST PAIN, UNSPECIFIED TYPE: ICD-10-CM

## 2021-04-27 DIAGNOSIS — F32.9 MAJOR DEPRESSIVE DISORDER, REMISSION STATUS UNSPECIFIED, UNSPECIFIED WHETHER RECURRENT: ICD-10-CM

## 2021-04-27 DIAGNOSIS — Z13.220 ENCOUNTER FOR LIPID SCREENING FOR CARDIOVASCULAR DISEASE: ICD-10-CM

## 2021-04-27 PROBLEM — R07.89 OTHER CHEST PAIN: Status: ACTIVE | Noted: 2021-04-27

## 2021-04-27 PROCEDURE — 99214 OFFICE O/P EST MOD 30 MIN: CPT | Mod: 25 | Performed by: FAMILY MEDICINE

## 2021-04-27 PROCEDURE — 90715 TDAP VACCINE 7 YRS/> IM: CPT | Performed by: FAMILY MEDICINE

## 2021-04-27 PROCEDURE — 90471 IMMUNIZATION ADMIN: CPT | Performed by: FAMILY MEDICINE

## 2021-04-27 RX ORDER — METHYLPREDNISOLONE 4 MG/1
TABLET ORAL
Qty: 21 TABLET | Refills: 0 | Status: SHIPPED | OUTPATIENT
Start: 2021-04-27 | End: 2021-05-11

## 2021-04-27 RX ORDER — LISINOPRIL 10 MG/1
10 TABLET ORAL DAILY
Qty: 30 TABLET | Refills: 0 | Status: SHIPPED | OUTPATIENT
Start: 2021-04-27 | End: 2021-06-01

## 2021-04-27 RX ORDER — SERTRALINE HYDROCHLORIDE 25 MG/1
25 TABLET, FILM COATED ORAL DAILY
Qty: 14 TABLET | Refills: 0 | Status: SHIPPED | OUTPATIENT
Start: 2021-04-27 | End: 2021-05-11

## 2021-04-27 ASSESSMENT — ENCOUNTER SYMPTOMS
CHILLS: 0
NAUSEA: 0
SHORTNESS OF BREATH: 0
BLURRED VISION: 0
FEVER: 0
DIARRHEA: 0
WEIGHT LOSS: 0
DOUBLE VISION: 0
CONSTIPATION: 0
DIZZINESS: 0
NERVOUS/ANXIOUS: 1
COUGH: 0
DEPRESSION: 1
MYALGIAS: 0
WEAKNESS: 0
BRUISES/BLEEDS EASILY: 0
PALPITATIONS: 0

## 2021-04-27 ASSESSMENT — ANXIETY QUESTIONNAIRES
GAD7 TOTAL SCORE: 19
2. NOT BEING ABLE TO STOP OR CONTROL WORRYING: NEARLY EVERY DAY
5. BEING SO RESTLESS THAT IT IS HARD TO SIT STILL: SEVERAL DAYS
3. WORRYING TOO MUCH ABOUT DIFFERENT THINGS: NEARLY EVERY DAY
1. FEELING NERVOUS, ANXIOUS, OR ON EDGE: NEARLY EVERY DAY
4. TROUBLE RELAXING: NEARLY EVERY DAY
7. FEELING AFRAID AS IF SOMETHING AWFUL MIGHT HAPPEN: NEARLY EVERY DAY
6. BECOMING EASILY ANNOYED OR IRRITABLE: NEARLY EVERY DAY

## 2021-04-27 ASSESSMENT — PATIENT HEALTH QUESTIONNAIRE - PHQ9
CLINICAL INTERPRETATION OF PHQ2 SCORE: 4
SUM OF ALL RESPONSES TO PHQ QUESTIONS 1-9: 19
5. POOR APPETITE OR OVEREATING: 2 - MORE THAN HALF THE DAYS

## 2021-04-27 ASSESSMENT — FIBROSIS 4 INDEX: FIB4 SCORE: 0.6

## 2021-04-27 NOTE — ASSESSMENT & PLAN NOTE
He gets his BP checked frequently over the course of several months by the nurses at his job and they have noticed his BP has been 150s+ / 90s+.  He has noticed an increase in his nose bleeds over the last several months, felt that these were related to seasonal changes.    He does note that since his job required him to cut back his hours to 40 hours a week his nosebleeds have been improving.    Patient reports that he has been under a lot of stress recently and feels that his symptoms may be a contributing factor to this.      Reports chest pain, dizziness.  Denies shortness of breath, diaphoresis, nausea, vomiting.

## 2021-04-27 NOTE — PROGRESS NOTES
Dwayne Jackson is a pleasant 35 y.o. male here to establish care, chest pain, high blood pressure, dizziness.    HPI:   Essential hypertension  He gets his BP checked frequently over the course of several months by the nurses at his job and they have noticed his BP has been 150s+ / 90s+.  He has noticed an increase in his nose bleeds over the last several months, felt that these were related to seasonal changes.    He does note that since his job required him to cut back his hours to 40 hours a week his nosebleeds have been improving.    Patient reports that he has been under a lot of stress recently and feels that his symptoms may be a contributing factor to this.      Reports chest pain, dizziness.  Denies shortness of breath, diaphoresis, nausea, vomiting.    Dizziness  Patient was recently seen in the emergency room for spinning, diagnosed with vertigo and sent home on prescription for meclizine and Allegra-D.    Patient reports that he has been taking his medications but his symptoms continue.    He describes the dizziness as room spinning, worsening with activity and rapid head movements.   He does report that if he moves his head slowly, the dizziness is not as severe.    Denies weakness, palpitations.    Other chest pain  Patient reports chest tightness off and on over the last several years, feels that it is worse when he is under a lot of stress.  He describes the chest pain as chest tightness that makes his left hand go numb, unknown length of time the symptoms last.  Patient reports that he currently is having chest pain while in clinic.    Denies any family history of sudden cardiac death, palpitations, jaw pain, nausea, vomiting, diaphoresis.      Major depressive disorder  Patient reports that for the last several months he has been increasing his stress and anxiety.    He feels that his depression and anxiety may be related to increase in stress and feelings that his body is starting to give out  on him.  Patient reports that he does have a very strong support system at home with his wife.  He currently denies SI or HI.    Depression Screening    Little interest or pleasure in doing things?  2 - more than half the days   Feeling down, depressed , or hopeless? 2 - more than half the days   Trouble falling or staying asleep, or sleeping too much?  2 - more than half the days   Feeling tired or having little energy?  3 - nearly every day   Poor appetite or overeating?  2 - more than half the days   Feeling bad about yourself - or that you are a failure or have let yourself or your family down? 2 - more than half the days   Trouble concentrating on things, such as reading the newspaper or watching television? 2 - more than half the days   Moving or speaking so slowly that other people could have noticed.  Or the opposite - being so fidgety or restless that you have been moving around a lot more than usual?  2 - more than half the days   Thoughts that you would be better off dead, or of hurting yourself?  2 - more than half the days   Patient Health Questionnaire Score: 19       If depressive symptoms identified deferred to follow up visit unless specifically addressed in assesment and plan.    Interpretation of PHQ-9 Total Score   Score Severity   1-4 No Depression   5-9 Mild Depression   10-14 Moderate Depression   15-19 Moderately Severe Depression   20-27 Severe Depression    KIKE-7 Questionnaire    Feeling nervous, anxious, or on edge: Nearly every day  Not being able to sop or control worrying: Nearly every day  Worrying too much about different things: Nearly every day  Trouble relaxing: Nearly every day  Being so restless that it's hard to sit still: Several days  Becoming easily annoyed or irritable: Nearly every day  Feeling afraid as if something awful might happen: Nearly every day  Total: 19    Interpretation of KIKE 7 Total Score   Score Severity :  0-4 No Anxiety   5-9 Mild Anxiety  10-14 Moderate  Anxiety  15-21 Severe Anxiety            Current medicines (including changes today)  Current Outpatient Medications   Medication Sig Dispense Refill   • methylPREDNISolone (MEDROL DOSEPAK) 4 MG Tablet Therapy Pack As directed on the packaging label. 21 tablet 0   • sertraline (ZOLOFT) 25 MG tablet Take 1 tablet by mouth every day. 14 tablet 0   • lisinopril (PRINIVIL) 10 MG Tab Take 1 tablet by mouth every day. 30 tablet 0   • aspirin (ASA) 325 MG Tab Take 650 mg by mouth every 6 hours as needed (For chest pain).     • multivitamin (THERAGRAN) Tab Take 1 tablet by mouth 2 times a day.     • ibuprofen (MOTRIN) 200 MG Tab Take 400-600 mg by mouth every 6 hours as needed for Mild Pain.     • meclizine (ANTIVERT) 25 MG Tab Take 1 tablet by mouth 3 times a day as needed. 30 tablet 0     No current facility-administered medications for this visit.       Past Medical/ Surgical History  He  has a past medical history of Hernia, inguinal.  He  has a past surgical history that includes hernia repair.    Social History  Social History     Tobacco Use   • Smoking status: Never Smoker   • Smokeless tobacco: Never Used   Substance Use Topics   • Alcohol use: Yes     Comment: once a month   • Drug use: Yes     Types: Marijuana     Comment: Marijuana     Social History     Social History Narrative   • Not on file        Family History  Family History   Problem Relation Age of Onset   • Hypertension Father    • Hyperlipidemia Father    • Other Father         high iron levels   • No Known Problems Brother    • Cancer Paternal Grandmother         leukemia   • No Known Problems Brother      Family Status   Relation Name Status   • Mo unknown Other   • Fa  Alive   • Bro Zen Alive   • PGMo     • PGFa     • Bro Juice Alive         Review of Systems   Constitutional: Negative for chills, fever and weight loss.   HENT: Negative for hearing loss.    Eyes: Negative for blurred vision and double vision.   Respiratory:  "Negative for cough and shortness of breath.    Cardiovascular: Positive for chest pain. Negative for palpitations and leg swelling.   Gastrointestinal: Negative for constipation, diarrhea and nausea.   Genitourinary: Negative.    Musculoskeletal: Negative for myalgias.   Skin: Negative for rash.   Neurological: Negative for dizziness and weakness.   Endo/Heme/Allergies: Does not bruise/bleed easily.   Psychiatric/Behavioral: Positive for depression. Negative for suicidal ideas. The patient is nervous/anxious.          Objective:     /95 (BP Location: Left arm, Patient Position: Sitting, BP Cuff Size: Adult)   Pulse 90   Temp 36.7 °C (98 °F) (Temporal)   Resp 18   Ht 1.76 m (5' 9.29\")   Wt 94.8 kg (209 lb 1.7 oz)   SpO2 95%  Body mass index is 30.62 kg/m².    Physical Exam   Constitutional: He is oriented to person, place, and time and well-developed, well-nourished, and in no distress. No distress.   HENT:   Head: Normocephalic and atraumatic.   Right Ear: External ear normal.   Left Ear: External ear normal.   Eyes: Pupils are equal, round, and reactive to light. Conjunctivae are normal.   Cardiovascular: Normal rate and regular rhythm. Exam reveals no gallop and no friction rub.   No murmur heard.  Pulmonary/Chest: Effort normal and breath sounds normal. He has no wheezes. He has no rales. He exhibits tenderness.       Abdominal: Soft. Bowel sounds are normal. There is no abdominal tenderness.   Musculoskeletal:      Cervical back: Normal range of motion and neck supple.   Neurological: He is alert and oriented to person, place, and time. Gait normal.   Skin: Skin is warm and dry. No rash noted.   Psychiatric: Affect normal.      Imaging:   On 04/13/2021 reviewed  Chest x-ray:  No radiographic evidence of acute cardiopulmonary process  CTA neck: Showed enlarged lymph nodes  CTA head: Sinus disease    EKG:   Normal    Labs:  Most recent labs from 04/13/2021 reviewed.      Assessment and Plan:   The " following treatment plan was discussed    1. Immunization due  Patient will be up-to-date with all of his vaccines  - Tdap =>6yo IM    2. Essential hypertension  Chronic, uncontrolled.  Patient reports sustained elevated blood pressure, blood pressure in clinic is also elevated.  We will start patient on lisinopril 10 mg daily.  I did discuss risks and benefits of this medications.  I did provide him with medication education to take on with him.  - lisinopril (PRINIVIL) 10 MG Tab; Take 1 tablet by mouth every day.  Dispense: 30 tablet; Refill: 0    3. Dizziness  Reoccurring.  I suspect that this is potentially due to vestibular neuritis.  I went ahead and ordered a steroid Dosepak and a referral to vestibular rehab.  Patient reporting is consistent with this.  - REFERRAL TO PHYSICAL THERAPY  - methylPREDNISolone (MEDROL DOSEPAK) 4 MG Tablet Therapy Pack; As directed on the packaging label.  Dispense: 21 tablet; Refill: 0    4. Major depressive disorder, remission status unspecified, unspecified whether recurrent  Chronic, uncontrolled.  Patient confirms several times that he has not currently or actively suicidal.  I did recommend that he follow-up with [facility] behavioral health along with starting himself on Zoloft.  Patient appeared to be very reluctant to do either, though his wife did assure him that she also was on Zoloft at 1 point and it was very beneficial for her.  I encouraged the patient to set himself up with [facility] in the event that he is unable to get behavioral health and of relatively soon amount of time.  - REFERRAL TO BEHAVIORAL HEALTH  - sertraline (ZOLOFT) 25 MG tablet; Take 1 tablet by mouth every day.  Dispense: 14 tablet; Refill: 0    5. Chest pain, unspecified type  New to the patient.  I was able to reelicit chest wall pain on the right side, patient did report no change in the pain with deep breathing.  I suspect that this may be stress related and more costochondritis pain  versus cardiac or pulmonary etiology.  We will go ahead and complete a troponin and a D-dimer to rule these out.  EKG was normal  - TROPONIN; Future  - D-DIMER; Future    6. Encounter for lipid screening for cardiovascular disease  - Lipid Profile; Future    7. Encounter for vitamin deficiency screening  - VITAMIN D,25 HYDROXY; Future    8. Screening for diabetes mellitus  Patient had elevated blood sugar in his previous visit in the ER, we will go ahead and investigate this further.  Blood work ordered.  - Comp Metabolic Panel; Future  - ESTIMATED GFR; Future  - HEMOGLOBIN A1C; Future    9. Screening for deficiency anemia    - CBC WITHOUT DIFFERENTIAL; Future    10. Obesity (BMI 30-39.9)    - Patient identified as having weight management issue.  Appropriate orders and counseling given.      Records requested.  Followup: Return in about 2 weeks (around 5/11/2021).    Please note that this dictation was created using voice recognition software. I have made every reasonable attempt to correct obvious errors, but I expect that there are errors of grammar and possibly content that I did not discover before finalizing the note.

## 2021-04-27 NOTE — ASSESSMENT & PLAN NOTE
Patient reports that for the last several months he has been increasing his stress and anxiety.    He feels that his depression and anxiety may be related to increase in stress and feelings that his body is starting to give out on him.  Patient reports that he does have a very strong support system at home with his wife.  He currently denies SI or HI.    Depression Screening    Little interest or pleasure in doing things?  2 - more than half the days   Feeling down, depressed , or hopeless? 2 - more than half the days   Trouble falling or staying asleep, or sleeping too much?  2 - more than half the days   Feeling tired or having little energy?  3 - nearly every day   Poor appetite or overeating?  2 - more than half the days   Feeling bad about yourself - or that you are a failure or have let yourself or your family down? 2 - more than half the days   Trouble concentrating on things, such as reading the newspaper or watching television? 2 - more than half the days   Moving or speaking so slowly that other people could have noticed.  Or the opposite - being so fidgety or restless that you have been moving around a lot more than usual?  2 - more than half the days   Thoughts that you would be better off dead, or of hurting yourself?  2 - more than half the days   Patient Health Questionnaire Score: 19       If depressive symptoms identified deferred to follow up visit unless specifically addressed in assesment and plan.    Interpretation of PHQ-9 Total Score   Score Severity   1-4 No Depression   5-9 Mild Depression   10-14 Moderate Depression   15-19 Moderately Severe Depression   20-27 Severe Depression    KIKE-7 Questionnaire    Feeling nervous, anxious, or on edge: Nearly every day  Not being able to sop or control worrying: Nearly every day  Worrying too much about different things: Nearly every day  Trouble relaxing: Nearly every day  Being so restless that it's hard to sit still: Several days  Becoming easily  annoyed or irritable: Nearly every day  Feeling afraid as if something awful might happen: Nearly every day  Total: 19    Interpretation of KIKE 7 Total Score   Score Severity :  0-4 No Anxiety   5-9 Mild Anxiety  10-14 Moderate Anxiety  15-21 Severe Anxiety

## 2021-04-27 NOTE — PATIENT INSTRUCTIONS
[facility]    Sertraline tablets  What is this medicine?  SERTRALINE (SER tra dez) is used to treat depression. It may also be used to treat obsessive compulsive disorder, panic disorder, post-trauma stress, premenstrual dysphoric disorder (PMDD) or social anxiety.  This medicine may be used for other purposes; ask your health care provider or pharmacist if you have questions.  COMMON BRAND NAME(S): Zoloft  What should I tell my health care provider before I take this medicine?  They need to know if you have any of these conditions:  · bleeding disorders  · bipolar disorder or a family history of bipolar disorder  · glaucoma  · heart disease  · high blood pressure  · history of irregular heartbeat  · history of low levels of calcium, magnesium, or potassium in the blood  · if you often drink alcohol  · liver disease  · receiving electroconvulsive therapy  · seizures  · suicidal thoughts, plans, or attempt; a previous suicide attempt by you or a family member  · take medicines that treat or prevent blood clots  · thyroid disease  · an unusual or allergic reaction to sertraline, other medicines, foods, dyes, or preservatives  · pregnant or trying to get pregnant  · breast-feeding  How should I use this medicine?  Take this medicine by mouth with a glass of water. Follow the directions on the prescription label. You can take it with or without food. Take your medicine at regular intervals. Do not take your medicine more often than directed. Do not stop taking this medicine suddenly except upon the advice of your doctor. Stopping this medicine too quickly may cause serious side effects or your condition may worsen.  A special MedGuide will be given to you by the pharmacist with each prescription and refill. Be sure to read this information carefully each time.  Talk to your pediatrician regarding the use of this medicine in children. While this drug may be prescribed for children as young as 7 years for  selected conditions, precautions do apply.  Overdosage: If you think you have taken too much of this medicine contact a poison control center or emergency room at once.  NOTE: This medicine is only for you. Do not share this medicine with others.  What if I miss a dose?  If you miss a dose, take it as soon as you can. If it is almost time for your next dose, take only that dose. Do not take double or extra doses.  What may interact with this medicine?  Do not take this medicine with any of the following medications:  · cisapride  · dronedarone  · linezolid  · MAOIs like Carbex, Eldepryl, Marplan, Nardil, and Parnate  · methylene blue (injected into a vein)  · pimozide  · thioridazine  This medicine may also interact with the following medications:  · alcohol  · amphetamines  · aspirin and aspirin-like medicines  · certain medicines for depression, anxiety, or psychotic disturbances  · certain medicines for fungal infections like ketoconazole, fluconazole, posaconazole, and itraconazole  · certain medicines for irregular heart beat like flecainide, quinidine, propafenone  · certain medicines for migraine headaches like almotriptan, eletriptan, frovatriptan, naratriptan, rizatriptan, sumatriptan, zolmitriptan  · certain medicines for sleep  · certain medicines for seizures like carbamazepine, valproic acid, phenytoin  · certain medicines that treat or prevent blood clots like warfarin, enoxaparin, dalteparin  · cimetidine  · digoxin  · diuretics  · fentanyl  · isoniazid  · lithium  · NSAIDs, medicines for pain and inflammation, like ibuprofen or naproxen  · other medicines that prolong the QT interval (cause an abnormal heart rhythm) like dofetilide  · rasagiline  · safinamide  · supplements like Sombrillo's wort, kava kava, valerian  · tolbutamide  · tramadol  · tryptophan  This list may not describe all possible interactions. Give your health care provider a list of all the medicines, herbs, non-prescription drugs,  or dietary supplements you use. Also tell them if you smoke, drink alcohol, or use illegal drugs. Some items may interact with your medicine.  What should I watch for while using this medicine?  Tell your doctor if your symptoms do not get better or if they get worse. Visit your doctor or health care professional for regular checks on your progress. Because it may take several weeks to see the full effects of this medicine, it is important to continue your treatment as prescribed by your doctor.  Patients and their families should watch out for new or worsening thoughts of suicide or depression. Also watch out for sudden changes in feelings such as feeling anxious, agitated, panicky, irritable, hostile, aggressive, impulsive, severely restless, overly excited and hyperactive, or not being able to sleep. If this happens, especially at the beginning of treatment or after a change in dose, call your health care professional.  You may get drowsy or dizzy. Do not drive, use machinery, or do anything that needs mental alertness until you know how this medicine affects you. Do not stand or sit up quickly, especially if you are an older patient. This reduces the risk of dizzy or fainting spells. Alcohol may interfere with the effect of this medicine. Avoid alcoholic drinks.  Your mouth may get dry. Chewing sugarless gum or sucking hard candy, and drinking plenty of water may help. Contact your doctor if the problem does not go away or is severe.  What side effects may I notice from receiving this medicine?  Side effects that you should report to your doctor or health care professional as soon as possible:  · allergic reactions like skin rash, itching or hives, swelling of the face, lips, or tongue  · anxious  · black, tarry stools  · changes in vision  · confusion  · elevated mood, decreased need for sleep, racing thoughts, impulsive behavior  · eye pain  · fast, irregular heartbeat  · feeling faint or lightheaded,  falls  · feeling agitated, angry, or irritable  · hallucination, loss of contact with reality  · loss of balance or coordination  · loss of memory  · painful or prolonged erections  · restlessness, pacing, inability to keep still  · seizures  · stiff muscles  · suicidal thoughts or other mood changes  · trouble sleeping  · unusual bleeding or bruising  · unusually weak or tired  · vomiting  Side effects that usually do not require medical attention (report to your doctor or health care professional if they continue or are bothersome):  · change in appetite or weight  · change in sex drive or performance  · diarrhea  · increased sweating  · indigestion, nausea  · tremors  This list may not describe all possible side effects. Call your doctor for medical advice about side effects. You may report side effects to FDA at 8-233-AWA-2694.  Where should I keep my medicine?  Keep out of the reach of children.  Store at room temperature between 15 and 30 degrees C (59 and 86 degrees F). Throw away any unused medicine after the expiration date.  NOTE: This sheet is a summary. It may not cover all possible information. If you have questions about this medicine, talk to your doctor, pharmacist, or health care provider.  © 2020 [employer] Standard (2019-12-10 10:09:27)  Lisinopril tablets  What is this medicine?  LISINOPRIL (lyse IN oh pril) is an ACE inhibitor. This medicine is used to treat high blood pressure and heart failure. It is also used to protect the heart immediately after a heart attack.  This medicine may be used for other purposes; ask your health care provider or pharmacist if you have questions.  COMMON BRAND NAME(S): Prinivil, Zestril  What should I tell my health care provider before I take this medicine?  They need to know if you have any of these conditions:  · diabetes  · heart or blood vessel disease  · kidney disease  · low blood pressure  · previous swelling of the tongue, face, or lips with difficulty  breathing, difficulty swallowing, hoarseness, or tightening of the throat  · an unusual or allergic reaction to lisinopril, other ACE inhibitors, insect venom, foods, dyes, or preservatives  · pregnant or trying to get pregnant  · breast-feeding  How should I use this medicine?  Take this medicine by mouth with a glass of water. Follow the directions on your prescription label. You may take this medicine with or without food. If it upsets your stomach, take it with food. Take your medicine at regular intervals. Do not take it more often than directed. Do not stop taking except on your doctor's advice.  Talk to your pediatrician regarding the use of this medicine in children. Special care may be needed. While this drug may be prescribed for children as young as 6 years of age for selected conditions, precautions do apply.  Overdosage: If you think you have taken too much of this medicine contact a poison control center or emergency room at once.  NOTE: This medicine is only for you. Do not share this medicine with others.  What if I miss a dose?  If you miss a dose, take it as soon as you can. If it is almost time for your next dose, take only that dose. Do not take double or extra doses.  What may interact with this medicine?  Do not take this medicine with any of the following medications:  · hymenoptera venom  · sacubitril; valsartan  This medicines may also interact with the following medications:  · aliskiren  · angiotensin receptor blockers, like losartan or valsartan  · certain medicines for diabetes  · diuretics  · everolimus  · gold compounds  · lithium  · NSAIDs, medicines for pain and inflammation, like ibuprofen or naproxen  · potassium salts or supplements  · salt substitutes  · sirolimus  · temsirolimus  This list may not describe all possible interactions. Give your health care provider a list of all the medicines, herbs, non-prescription drugs, or dietary supplements you use. Also tell them if you  smoke, drink alcohol, or use illegal drugs. Some items may interact with your medicine.  What should I watch for while using this medicine?  Visit your doctor or health care professional for regular check ups. Check your blood pressure as directed. Ask your doctor what your blood pressure should be, and when you should contact him or her.  Do not treat yourself for coughs, colds, or pain while you are using this medicine without asking your doctor or health care professional for advice. Some ingredients may increase your blood pressure.  Women should inform their doctor if they wish to become pregnant or think they might be pregnant. There is a potential for serious side effects to an unborn child. Talk to your health care professional or pharmacist for more information.  Check with your doctor or health care professional if you get an attack of severe diarrhea, nausea and vomiting, or if you sweat a lot. The loss of too much body fluid can make it dangerous for you to take this medicine.  You may get drowsy or dizzy. Do not drive, use machinery, or do anything that needs mental alertness until you know how this drug affects you. Do not stand or sit up quickly, especially if you are an older patient. This reduces the risk of dizzy or fainting spells. Alcohol can make you more drowsy and dizzy. Avoid alcoholic drinks.  Avoid salt substitutes unless you are told otherwise by your doctor or health care professional.  What side effects may I notice from receiving this medicine?  Side effects that you should report to your doctor or health care professional as soon as possible:  · allergic reactions like skin rash, itching or hives, swelling of the hands, feet, face, lips, throat, or tongue  · breathing problems  · signs and symptoms of kidney injury like trouble passing urine or change in the amount of urine  · signs and symptoms of increased potassium like muscle weakness; chest pain; or fast, irregular  heartbeat  · signs and symptoms of liver injury like dark yellow or brown urine; general ill feeling or flu-like symptoms; light-colored stools; loss of appetite; nausea; right upper belly pain; unusually weak or tired; yellowing of the eyes or skin  · signs and symptoms of low blood pressure like dizziness; feeling faint or lightheaded, falls; unusually weak or tired  · stomach pain with or without nausea and vomiting  Side effects that usually do not require medical attention (report to your doctor or health care professional if they continue or are bothersome):  · changes in taste  · cough  · dizziness  · fever  · headache  · sensitivity to light  This list may not describe all possible side effects. Call your doctor for medical advice about side effects. You may report side effects to FDA at 8-647-HRW-3778.  Where should I keep my medicine?  Keep out of the reach of children.  Store at room temperature between 15 and 30 degrees C (59 and 86 degrees F). Protect from moisture. Keep container tightly closed. Throw away any unused medicine after the expiration date.  NOTE: This sheet is a summary. It may not cover all possible information. If you have questions about this medicine, talk to your doctor, pharmacist, or health care provider.  © 2020 [employer]vier/Gold Standard (2017-02-06 12:52:35)

## 2021-04-27 NOTE — ASSESSMENT & PLAN NOTE
Patient was recently seen in the emergency room for spinning, diagnosed with vertigo and sent home on prescription for meclizine and Allegra-D.    Patient reports that he has been taking his medications but his symptoms continue.    He describes the dizziness as room spinning, worsening with activity and rapid head movements.   He does report that if he moves his head slowly, the dizziness is not as severe.    Denies weakness, palpitations.

## 2021-04-27 NOTE — ASSESSMENT & PLAN NOTE
Patient reports chest tightness off and on over the last several years, feels that it is worse when he is under a lot of stress.  He describes the chest pain as chest tightness that makes his left hand go numb, unknown length of time the symptoms last.  Patient reports that he currently is having chest pain while in clinic.    Denies any family history of sudden cardiac death, palpitations, jaw pain, nausea, vomiting, diaphoresis.

## 2021-04-28 ENCOUNTER — HOSPITAL ENCOUNTER (OUTPATIENT)
Dept: LAB | Facility: MEDICAL CENTER | Age: 36
End: 2021-04-28
Attending: FAMILY MEDICINE
Payer: COMMERCIAL

## 2021-04-28 DIAGNOSIS — Z13.21 ENCOUNTER FOR VITAMIN DEFICIENCY SCREENING: ICD-10-CM

## 2021-04-28 DIAGNOSIS — Z13.0 SCREENING FOR DEFICIENCY ANEMIA: ICD-10-CM

## 2021-04-28 DIAGNOSIS — Z13.1 SCREENING FOR DIABETES MELLITUS: ICD-10-CM

## 2021-04-28 DIAGNOSIS — Z13.220 ENCOUNTER FOR LIPID SCREENING FOR CARDIOVASCULAR DISEASE: ICD-10-CM

## 2021-04-28 DIAGNOSIS — Z13.6 ENCOUNTER FOR LIPID SCREENING FOR CARDIOVASCULAR DISEASE: ICD-10-CM

## 2021-04-28 DIAGNOSIS — R07.9 CHEST PAIN, UNSPECIFIED TYPE: ICD-10-CM

## 2021-04-28 LAB
ALBUMIN SERPL BCP-MCNC: 4.6 G/DL (ref 3.2–4.9)
ALBUMIN/GLOB SERPL: 1.5 G/DL
ALP SERPL-CCNC: 84 U/L (ref 30–99)
ALT SERPL-CCNC: 82 U/L (ref 2–50)
ANION GAP SERPL CALC-SCNC: 12 MMOL/L (ref 7–16)
AST SERPL-CCNC: 35 U/L (ref 12–45)
BILIRUB SERPL-MCNC: 0.6 MG/DL (ref 0.1–1.5)
BUN SERPL-MCNC: 14 MG/DL (ref 8–22)
CALCIUM SERPL-MCNC: 9.2 MG/DL (ref 8.4–10.2)
CHLORIDE SERPL-SCNC: 104 MMOL/L (ref 96–112)
CHOLEST SERPL-MCNC: 200 MG/DL (ref 100–199)
CO2 SERPL-SCNC: 21 MMOL/L (ref 20–33)
CREAT SERPL-MCNC: 0.67 MG/DL (ref 0.5–1.4)
D DIMER PPP IA.FEU-MCNC: <0.27 UG/ML (FEU) (ref 0–0.5)
ERYTHROCYTE [DISTWIDTH] IN BLOOD BY AUTOMATED COUNT: 37.7 FL (ref 35.9–50)
EST. AVERAGE GLUCOSE BLD GHB EST-MCNC: 103 MG/DL
FASTING STATUS PATIENT QL REPORTED: NORMAL
GLOBULIN SER CALC-MCNC: 3 G/DL (ref 1.9–3.5)
GLUCOSE SERPL-MCNC: 100 MG/DL (ref 65–99)
HBA1C MFR BLD: 5.2 % (ref 4–5.6)
HCT VFR BLD AUTO: 47.9 % (ref 42–52)
HDLC SERPL-MCNC: 33 MG/DL
HGB BLD-MCNC: 16.8 G/DL (ref 14–18)
LDLC SERPL CALC-MCNC: 108 MG/DL
MCH RBC QN AUTO: 31.1 PG (ref 27–33)
MCHC RBC AUTO-ENTMCNC: 35.1 G/DL (ref 33.7–35.3)
MCV RBC AUTO: 88.5 FL (ref 81.4–97.8)
PLATELET # BLD AUTO: 308 K/UL (ref 164–446)
PMV BLD AUTO: 8.8 FL (ref 9–12.9)
POTASSIUM SERPL-SCNC: 4.2 MMOL/L (ref 3.6–5.5)
PROT SERPL-MCNC: 7.6 G/DL (ref 6–8.2)
RBC # BLD AUTO: 5.41 M/UL (ref 4.7–6.1)
SODIUM SERPL-SCNC: 137 MMOL/L (ref 135–145)
TRIGL SERPL-MCNC: 294 MG/DL (ref 0–149)
TROPONIN T SERPL-MCNC: <6 NG/L (ref 6–19)
WBC # BLD AUTO: 8 K/UL (ref 4.8–10.8)

## 2021-04-28 PROCEDURE — 84484 ASSAY OF TROPONIN QUANT: CPT

## 2021-04-28 PROCEDURE — 83036 HEMOGLOBIN GLYCOSYLATED A1C: CPT

## 2021-04-28 PROCEDURE — 85027 COMPLETE CBC AUTOMATED: CPT

## 2021-04-28 PROCEDURE — 36415 COLL VENOUS BLD VENIPUNCTURE: CPT

## 2021-04-28 PROCEDURE — 85379 FIBRIN DEGRADATION QUANT: CPT

## 2021-04-28 PROCEDURE — 80053 COMPREHEN METABOLIC PANEL: CPT

## 2021-04-28 PROCEDURE — 82306 VITAMIN D 25 HYDROXY: CPT

## 2021-04-28 PROCEDURE — 80061 LIPID PANEL: CPT

## 2021-04-29 LAB — 25(OH)D3 SERPL-MCNC: 24 NG/ML (ref 30–80)

## 2021-04-30 NOTE — RESULT ENCOUNTER NOTE
Please call the patient and also let him know that his vitamin D levels are low and he needs to take an over-the-counter supplement.  This would be anywhere from 1000 to 2000 international units a day of vitamin D.

## 2021-05-11 ENCOUNTER — OFFICE VISIT (OUTPATIENT)
Dept: MEDICAL GROUP | Facility: MEDICAL CENTER | Age: 36
End: 2021-05-11
Payer: COMMERCIAL

## 2021-05-11 VITALS
WEIGHT: 206.9 LBS | SYSTOLIC BLOOD PRESSURE: 130 MMHG | DIASTOLIC BLOOD PRESSURE: 80 MMHG | RESPIRATION RATE: 17 BRPM | HEART RATE: 94 BPM | BODY MASS INDEX: 29.62 KG/M2 | TEMPERATURE: 97 F | HEIGHT: 70 IN | OXYGEN SATURATION: 93 %

## 2021-05-11 DIAGNOSIS — I10 ESSENTIAL HYPERTENSION: ICD-10-CM

## 2021-05-11 DIAGNOSIS — R74.8 ABNORMAL LIVER ENZYMES: ICD-10-CM

## 2021-05-11 DIAGNOSIS — E55.9 VITAMIN D INSUFFICIENCY: ICD-10-CM

## 2021-05-11 DIAGNOSIS — R42 DIZZINESS: ICD-10-CM

## 2021-05-11 DIAGNOSIS — F32.4 MAJOR DEPRESSIVE DISORDER IN PARTIAL REMISSION, UNSPECIFIED WHETHER RECURRENT (HCC): ICD-10-CM

## 2021-05-11 PROCEDURE — 99214 OFFICE O/P EST MOD 30 MIN: CPT | Performed by: FAMILY MEDICINE

## 2021-05-11 RX ORDER — SERTRALINE HYDROCHLORIDE 100 MG/1
100 TABLET, FILM COATED ORAL DAILY
Qty: 14 TABLET | Refills: 0 | Status: SHIPPED | OUTPATIENT
Start: 2021-05-11 | End: 2021-06-08 | Stop reason: SDUPTHER

## 2021-05-11 ASSESSMENT — ENCOUNTER SYMPTOMS
CHILLS: 0
DIARRHEA: 0
PALPITATIONS: 0
NERVOUS/ANXIOUS: 1
COUGH: 0
MYALGIAS: 0
BLURRED VISION: 0
CONSTIPATION: 0
SHORTNESS OF BREATH: 0
WEAKNESS: 0
WEIGHT LOSS: 0
DIZZINESS: 0
DOUBLE VISION: 0
DEPRESSION: 1
FEVER: 0
INSOMNIA: 1
NAUSEA: 0
BRUISES/BLEEDS EASILY: 0

## 2021-05-11 ASSESSMENT — FIBROSIS 4 INDEX: FIB4 SCORE: 0.41

## 2021-05-11 NOTE — ASSESSMENT & PLAN NOTE
Patient reports that within the first 2 days of taking the Zoloft he did notice some mild improvement in his symptoms.  Then he noticed that his symptoms then plateaued.  He is currently taking Zoloft 50 mg daily.  I did recommend a follow-up with [facility] but patient reports that he would rather speak with somebody in person versus over Zoom or on the phone.    Denies any SI/HI

## 2021-05-11 NOTE — PROGRESS NOTES
Dwayne Jackson is a pleasant 35 y.o. male here for   Chief Complaint   Patient presents with   • Follow-Up   • Lab Results      HPI:   Essential hypertension  Patient reports that he has been taking his lisinopril daily and has not noticed any unwanted side effects.  Blood pressure in clinic is within goal.    Dizziness  Patient reports that he completed the steroid Dosepak and did not go to vestibular rehab as he googled vestibular exercises.  He states that he is able to perform them at home and the room spinning has improved.  He does report intermittent lightheadedness.    Major depressive disorder  Patient reports that within the first 2 days of taking the Zoloft he did notice some mild improvement in his symptoms.  Then he noticed that his symptoms then plateaued.  He is currently taking Zoloft 50 mg daily.  I did recommend a follow-up with [employer] but patient reports that he would rather speak with somebody in person versus over Zoom or on the phone.    Denies any SI/HI      Current Medicines (including changes today)  Current Outpatient Medications   Medication Sig Dispense Refill   • sertraline (ZOLOFT) 50 MG Tab Take 1 tablet by mouth every day for 14 days. 14 tablet 0   • sertraline (ZOLOFT) 100 MG Tab Take 1 tablet by mouth every day for 14 days. 14 tablet 0   • lisinopril (PRINIVIL) 10 MG Tab Take 1 tablet by mouth every day. 30 tablet 0   • aspirin (ASA) 325 MG Tab Take 650 mg by mouth every 6 hours as needed (For chest pain).     • multivitamin (THERAGRAN) Tab Take 1 tablet by mouth 2 times a day.     • ibuprofen (MOTRIN) 200 MG Tab Take 400-600 mg by mouth every 6 hours as needed for Mild Pain.       No current facility-administered medications for this visit.     Past Medical/ Surgical History  He  has a past medical history of Hernia, inguinal.  He  has a past surgical history that includes hernia repair.    Review of Systems   Constitutional: Negative for chills, fever and weight  "loss.   HENT: Negative for hearing loss.    Eyes: Negative for blurred vision and double vision.   Respiratory: Negative for cough and shortness of breath.    Cardiovascular: Negative for chest pain, palpitations and leg swelling.   Gastrointestinal: Negative for constipation, diarrhea and nausea.   Genitourinary: Negative.    Musculoskeletal: Negative for myalgias.   Skin: Negative for rash.   Neurological: Negative for dizziness and weakness.   Endo/Heme/Allergies: Does not bruise/bleed easily.   Psychiatric/Behavioral: Positive for depression. Negative for suicidal ideas. The patient is nervous/anxious and has insomnia.          Objective:     /80 (BP Location: Left arm, Patient Position: Sitting, BP Cuff Size: Adult)   Pulse 94   Temp 36.1 °C (97 °F) (Temporal)   Resp 17   Ht 1.778 m (5' 10\")   Wt 93.9 kg (206 lb 14.4 oz)   SpO2 93%  Body mass index is 29.69 kg/m².    Physical Exam   Constitutional: He is oriented to person, place, and time and well-developed, well-nourished, and in no distress. No distress.   HENT:   Head: Normocephalic and atraumatic.   Eyes: Pupils are equal, round, and reactive to light. Conjunctivae are normal.   Pulmonary/Chest: Effort normal.   Musculoskeletal:      Cervical back: Normal range of motion and neck supple.   Neurological: He is alert and oriented to person, place, and time. Gait normal.   Skin: Skin is warm and dry. No rash noted.   Psychiatric: Affect normal.      Imaging:  No recent imaging to review    Labs  Recent labs from 04/20/2021 reviewed with the patient.    Results for ANDRES DAVEY AGUILAR (MRN [id number]) as of 5/11/2021 12:23   Ref. Range 4/28/2021 08:31   WBC Latest Ref Range: 4.8 - 10.8 K/uL 8.0   RBC Latest Ref Range: 4.70 - 6.10 M/uL 5.41   Hemoglobin Latest Ref Range: 14.0 - 18.0 g/dL 16.8   Hematocrit Latest Ref Range: 42.0 - 52.0 % 47.9   MCV Latest Ref Range: 81.4 - 97.8 fL 88.5   MCH Latest Ref Range: 27.0 - 33.0 pg 31.1   MCHC Latest Ref " Range: 33.7 - 35.3 g/dL 35.1   RDW Latest Ref Range: 35.9 - 50.0 fL 37.7   Platelet Count Latest Ref Range: 164 - 446 K/uL 308   MPV Latest Ref Range: 9.0 - 12.9 fL 8.8 (L)   Sodium Latest Ref Range: 135 - 145 mmol/L 137   Potassium Latest Ref Range: 3.6 - 5.5 mmol/L 4.2   Chloride Latest Ref Range: 96 - 112 mmol/L 104   Co2 Latest Ref Range: 20 - 33 mmol/L 21   Anion Gap Latest Ref Range: 7.0 - 16.0  12.0   Glucose Latest Ref Range: 65 - 99 mg/dL 100 (H)   Bun Latest Ref Range: 8 - 22 mg/dL 14   Creatinine Latest Ref Range: 0.50 - 1.40 mg/dL 0.67   GFR If  Latest Ref Range: >60 mL/min/1.73 m 2 >60   GFR If Non  Latest Ref Range: >60 mL/min/1.73 m 2 >60   Calcium Latest Ref Range: 8.4 - 10.2 mg/dL 9.2   AST(SGOT) Latest Ref Range: 12 - 45 U/L 35   ALT(SGPT) Latest Ref Range: 2 - 50 U/L 82 (H)   Alkaline Phosphatase Latest Ref Range: 30 - 99 U/L 84   Total Bilirubin Latest Ref Range: 0.1 - 1.5 mg/dL 0.6   Albumin Latest Ref Range: 3.2 - 4.9 g/dL 4.6   Total Protein Latest Ref Range: 6.0 - 8.2 g/dL 7.6   Globulin Latest Ref Range: 1.9 - 3.5 g/dL 3.0   A-G Ratio Latest Units: g/dL 1.5   Glycohemoglobin Latest Ref Range: 4.0 - 5.6 % 5.2   Estim. Avg Glu Latest Units: mg/dL 103   Fasting Status Unknown Fasting   Cholesterol,Tot Latest Ref Range: 100 - 199 mg/dL 200 (H)   Triglycerides Latest Ref Range: 0 - 149 mg/dL 294 (H)   HDL Latest Ref Range: >=40 mg/dL 33 (A)   LDL Latest Ref Range: <100 mg/dL 108 (H)   Troponin T Latest Ref Range: 6 - 19 ng/L <6   D-Dimer Screen Latest Ref Range: 0.00 - 0.50 ug/mL (FEU) <0.27   25-Hydroxy   Vitamin D 25 Latest Ref Range: 30 - 80 ng/mL 24 (L)     Assessment and Plan:   The following treatment plan was discussed    1. Major depressive disorder in partial remission, unspecified whether recurrent (HCC)  Chronic, unchanged.  With patient's report of noticeable change within the first couple of days of starting the Zoloft we will go ahead and increase his  dose to 50 mg for 2 weeks then increase up to 100 mg after another 2 weeks.  We will go ahead and have him follow-up with me in 4 weeks to determine the effectiveness of this medication.  I did mention talk [employer], but patient would prefer to see someone in person.  I did inform him of the referral to behavioral health and encouraged him to follow-up with them.  - sertraline (ZOLOFT) 50 MG Tab; Take 1 tablet by mouth every day for 14 days.  Dispense: 14 tablet; Refill: 0  - sertraline (ZOLOFT) 100 MG Tab; Take 1 tablet by mouth every day for 14 days.  Dispense: 14 tablet; Refill: 0    2. Essential hypertension  Chronic, improved.  I did recommend that the patient continue taking the lisinopril and we will evaluate again and his next appointment with me in 4 weeks about the effectiveness of the dose of 10 mg.    3. Dizziness  Improving.  Patient reports that the dizziness has improved since her last visit together.  Patient was reporting some lightheadedness, encouraged him to make sure he is drinking plenty of liquids daily.    4. Vitamin D insufficiency  New to the patient.  I recommended over-the-counter vitamin D supplementation 2000 units daily.  Patient states that he will start taking this.  We will then obtain a new vitamin D level in 3 months to determine the effectiveness of his replacement therapy.  - VITAMIN D,25 HYDROXY; Future    5. Abnormal liver enzymes  New to the patient.  Patient is having continued elevation in his ALT though this is been improved since his blood work several months prior.  We will go ahead and obtain ultrasound of his right upper quadrant to rule out possible fatty liver disease.  Patient does report a history of heavy drinking, though he denies currently.  - US-RUQ; Future      Followup: Return in about 4 weeks (around 6/8/2021) for blood work.    Please note that this dictation was created using voice recognition software. I have made every reasonable attempt to correct  obvious errors, but I expect that there are errors of grammar and possibly content that I did not discover before finalizing the note.

## 2021-05-11 NOTE — ASSESSMENT & PLAN NOTE
Patient reports that he has been taking his lisinopril daily and has not noticed any unwanted side effects.  Blood pressure in clinic is within goal.

## 2021-05-11 NOTE — PATIENT INSTRUCTIONS
Referral information sent to the following:  Behavioral Health [facility]  [address], [address]  [place] [address]  Phone: [phone]    High Triglycerides Eating Plan  Triglycerides are a type of fat in the blood. High levels of triglycerides can increase your risk of heart disease and stroke. If your triglyceride levels are high, choosing the right foods can help lower your triglycerides and keep your heart healthy. Work with your health care provider or a diet and nutrition specialist (dietitian) to develop an eating plan that is right for you.  What are tips for following this plan?  General guidelines    · Lose weight, if you are overweight. For most people, losing 5-10 lbs (2-5 kg) helps lower triglyceride levels. A weight-loss plan may include.  ? 30 minutes of exercise at least 5 days a week.  ? Reducing the amount of calories, sugar, and fat you eat.  · Eat a wide variety of fresh fruits, vegetables, and whole grains. These foods are high in fiber.  · Eat foods that contain healthy fats, such as fatty fish, nuts, seeds, and olive oil.  · Avoid foods that are high in added sugar, added salt (sodium), saturated fat, and trans fat.  · Avoid low-fiber, refined carbohydrates such as white bread, crackers, noodles, and white rice.  · Avoid foods with partially hydrogenated oils (trans fats), such as fried foods or stick margarine.  · Limit alcohol intake to no more than 1 drink a day for nonpregnant women and 2 drinks a day for men. One drink equals 12 oz of beer, 5 oz of wine, or 1½ oz of hard liquor. Your health care provider may recommend that you drink less depending on your overall health.  Reading food labels  · Check food labels for the amount of saturated fat. Choose foods with no or very little saturated fat.  · Check food labels for the amount of trans fat. Choose foods with no trans fat.  · Check food labels for the amount of cholesterol. Choose foods low in cholesterol. Ask your  dietitian how much cholesterol you should have each day.  · Check food labels for the amount of sodium. Choose foods with less than 140 milligrams (mg) per serving.  Shopping  · Buy dairy products labeled as nonfat (skim) or low-fat (1%).  · Avoid buying processed or prepackaged foods. These are often high in added sugar, sodium, and fat.  Cooking  · Choose healthy fats when cooking, such as olive oil or canola oil.  · Cook foods using lower fat methods, such as baking, broiling, boiling, or grilling.  · Make your own sauces, dressings, and marinades when possible, instead of buying them. Store-bought sauces, dressings, and marinades are often high in sodium and sugar.  Meal planning  · Eat more home-cooked food and less restaurant, buffet, and fast food.  · Eat fatty fish at least 2 times each week. Examples of fatty fish include salmon, trout, mackerel, tuna, and herring.  · If you eat whole eggs, do not eat more than 3 egg yolks per week.  What foods are recommended?  The items listed may not be a complete list. Talk with your dietitian about what dietary choices are best for you.  Grains  Whole wheat or whole grain breads, crackers, cereals, and pasta. Unsweetened oatmeal. Bulgur. Barley. Quinoa. Brown rice. Whole wheat flour tortillas.  Vegetables  Fresh or frozen vegetables. Low-sodium canned vegetables.  Fruits  All fresh, canned (in natural juice), or frozen fruits.  Meats and other protein foods  Skinless chicken or turkey. Ground chicken or turkey. Lean cuts of pork, trimmed of fat. Fish and seafood, especially salmon, trout, and herring. Egg whites. Dried beans, peas, or lentils. Unsalted nuts or seeds. Unsalted canned beans. Natural peanut or almond butter.  Dairy  Low-fat dairy products. Skim or low-fat (1%) milk. Reduced fat (2%) and low-sodium cheese. Low-fat ricotta cheese. Low-fat cottage cheese. Plain, low-fat yogurt.  Fats and oils  Tub margarine without trans fats. Light or reduced-fat  mayonnaise. Light or reduced-fat salad dressings. Avocado. Safflower, olive, sunflower, soybean, and canola oils.  What foods are not recommended?  The items listed may not be a complete list. Talk with your dietitian about what dietary choices are best for you.  Grains  White bread. White (regular) pasta. White rice. Cornbread. Bagels. Pastries. Crackers that contain trans fat.  Vegetables  Creamed or fried vegetables. Vegetables in a cheese sauce.  Fruits  Sweetened dried fruit. Canned fruit in syrup. Fruit juice.  Meats and other protein foods  Fatty cuts of meat. Ribs. Chicken wings. Acosta. Sausage. Bologna. Salami. Chitterlings. Fatback. Hot dogs. Bratwurst. Packaged lunch meats.  Dairy  Whole or reduced-fat (2%) milk. Half-and-half. Cream cheese. Full-fat or sweetened yogurt. Full-fat cheese. Nondairy creamers. Whipped toppings. Processed cheese or cheese spreads. Cheese curds.  Beverages  Alcohol. Sweetened drinks, such as soda, lemonade, fruit drinks, or punches.  Fats and oils  Butter. Stick margarine. Lard. Shortening. Ghee. Acosta fat. Tropical oils, such as coconut, palm kernel, or palm oils.  Sweets and desserts  Corn syrup. Sugars. Honey. Molasses. Candy. Jam and jelly. Syrup. Sweetened cereals. Cookies. Pies. Cakes. Donuts. Muffins. Ice cream.  Condiments  Store-bought sauces, dressings, and marinades that are high in sugar, such as ketchup and barbecue sauce.  Summary  · High levels of triglycerides can increase the risk of heart disease and stroke. Choosing the right foods can help lower your triglycerides.  · Eat plenty of fresh fruits, vegetables, and whole grains. Choose low-fat dairy and lean meats. Eat fatty fish at least twice a week.  · Avoid processed and prepackaged foods with added sugar, sodium, saturated fat, and trans fat.  · If you need suggestions or have questions about what types of food are good for you, talk with your health care provider or a dietitian.  This information is not  intended to replace advice given to you by your health care provider. Make sure you discuss any questions you have with your health care provider.  Document Released: 10/05/2005 Document Revised: 11/30/2018 Document Reviewed: 02/20/2018  Elsevier Patient Education © 2020 [employer]

## 2021-05-28 DIAGNOSIS — I10 ESSENTIAL HYPERTENSION: ICD-10-CM

## 2021-05-28 NOTE — TELEPHONE ENCOUNTER
Received request via: Pharmacy    Was the patient seen in the last year in this department? Yes    Does the patient have an active prescription (recently filled or refills available) for medication(s) requested? No     Requested Prescriptions     Pending Prescriptions Disp Refills   • lisinopril (PRINIVIL) 10 MG Tab [Pharmacy Med Name: LISINOPRIL 10 MG TABLET] 30 tablet 0     Sig: TAKE 1 TABLET BY MOUTH EVERY DAY

## 2021-06-01 ENCOUNTER — TELEPHONE (OUTPATIENT)
Dept: MEDICAL GROUP | Facility: MEDICAL CENTER | Age: 36
End: 2021-06-01

## 2021-06-01 RX ORDER — LISINOPRIL 10 MG/1
10 TABLET ORAL DAILY
Qty: 30 TABLET | Refills: 0 | Status: SHIPPED | OUTPATIENT
Start: 2021-06-01 | End: 2021-07-02 | Stop reason: SDUPTHER

## 2021-06-01 NOTE — TELEPHONE ENCOUNTER
Please confirm what prescription he needs refilled.  I did refill his lisinopril last week.    IZA Sy.

## 2021-06-08 DIAGNOSIS — F32.4 MAJOR DEPRESSIVE DISORDER IN PARTIAL REMISSION, UNSPECIFIED WHETHER RECURRENT (HCC): ICD-10-CM

## 2021-06-08 RX ORDER — SERTRALINE HYDROCHLORIDE 100 MG/1
100 TABLET, FILM COATED ORAL DAILY
Qty: 30 TABLET | Refills: 0 | Status: SHIPPED | OUTPATIENT
Start: 2021-06-08 | End: 2021-06-16

## 2021-06-15 ENCOUNTER — APPOINTMENT (OUTPATIENT)
Dept: MEDICAL GROUP | Facility: MEDICAL CENTER | Age: 36
End: 2021-06-15
Payer: COMMERCIAL

## 2021-06-16 ENCOUNTER — OFFICE VISIT (OUTPATIENT)
Dept: MEDICAL GROUP | Facility: MEDICAL CENTER | Age: 36
End: 2021-06-16
Payer: COMMERCIAL

## 2021-06-16 VITALS
BODY MASS INDEX: 28.41 KG/M2 | WEIGHT: 198.41 LBS | HEIGHT: 70 IN | RESPIRATION RATE: 16 BRPM | DIASTOLIC BLOOD PRESSURE: 57 MMHG | TEMPERATURE: 97.8 F | SYSTOLIC BLOOD PRESSURE: 107 MMHG | OXYGEN SATURATION: 97 % | HEART RATE: 82 BPM

## 2021-06-16 DIAGNOSIS — F32.9 MAJOR DEPRESSIVE DISORDER, REMISSION STATUS UNSPECIFIED, UNSPECIFIED WHETHER RECURRENT: ICD-10-CM

## 2021-06-16 PROCEDURE — 99214 OFFICE O/P EST MOD 30 MIN: CPT | Performed by: FAMILY MEDICINE

## 2021-06-16 RX ORDER — ESCITALOPRAM OXALATE 20 MG/1
20 TABLET ORAL DAILY
Qty: 30 TABLET | Refills: 11 | Status: SHIPPED | OUTPATIENT
Start: 2021-06-16 | End: 2022-07-06

## 2021-06-16 ASSESSMENT — ENCOUNTER SYMPTOMS
BLURRED VISION: 0
DEPRESSION: 0
DIARRHEA: 0
MYALGIAS: 0
SHORTNESS OF BREATH: 0
COUGH: 0
WEIGHT LOSS: 0
CHILLS: 0
CONSTIPATION: 0
BRUISES/BLEEDS EASILY: 0
FEVER: 0
WEAKNESS: 0
DOUBLE VISION: 0
NAUSEA: 0
DIZZINESS: 0
PALPITATIONS: 0

## 2021-06-16 ASSESSMENT — FIBROSIS 4 INDEX: FIB4 SCORE: 0.41

## 2021-06-16 NOTE — PATIENT INSTRUCTIONS
Bupropion extended-release tablets (Depression/Mood Disorders)  What is this medicine?  BUPROPION (byoo PROE pee on) is used to treat depression.  This medicine may be used for other purposes; ask your health care provider or pharmacist if you have questions.  COMMON BRAND NAME(S): Aplenzin, Budeprion XL, Forfivo XL, Wellbutrin XL  What should I tell my health care provider before I take this medicine?  They need to know if you have any of these conditions:  · an eating disorder, such as anorexia or bulimia  · bipolar disorder or psychosis  · diabetes or high blood sugar, treated with medication  · glaucoma  · head injury or brain tumor  · heart disease, previous heart attack, or irregular heart beat  · high blood pressure  · kidney or liver disease  · seizures (convulsions)  · suicidal thoughts or a previous suicide attempt  · Tourette's syndrome  · weight loss  · an unusual or allergic reaction to bupropion, other medicines, foods, dyes, or preservatives  · breast-feeding  · pregnant or trying to become pregnant  How should I use this medicine?  Take this medicine by mouth with a glass of water. Follow the directions on the prescription label. You can take it with or without food. If it upsets your stomach, take it with food. Do not crush, chew, or cut these tablets. This medicine is taken once daily at the same time each day. Do not take your medicine more often than directed. Do not stop taking this medicine suddenly except upon the advice of your doctor. Stopping this medicine too quickly may cause serious side effects or your condition may worsen.  A special MedGuide will be given to you by the pharmacist with each prescription and refill. Be sure to read this information carefully each time.  Talk to your pediatrician regarding the use of this medicine in children. Special care may be needed.  Overdosage: If you think you have taken too much of this medicine contact a poison control center or emergency room  at once.  NOTE: This medicine is only for you. Do not share this medicine with others.  What if I miss a dose?  If you miss a dose, skip the missed dose and take your next tablet at the regular time. Do not take double or extra doses.  What may interact with this medicine?  Do not take this medicine with any of the following medications:  · linezolid  · MAOIs like Azilect, Carbex, Eldepryl, Marplan, Nardil, and Parnate  · methylene blue (injected into a vein)  · other medicines that contain bupropion like Zyban  This medicine may also interact with the following medications:  · alcohol  · certain medicines for anxiety or sleep  · certain medicines for blood pressure like metoprolol, propranolol  · certain medicines for depression or psychotic disturbances  · certain medicines for HIV or AIDS like efavirenz, lopinavir, nelfinavir, ritonavir  · certain medicines for irregular heart beat like propafenone, flecainide  · certain medicines for Parkinson's disease like amantadine, levodopa  · certain medicines for seizures like carbamazepine, phenytoin, phenobarbital  · cimetidine  · clopidogrel  · cyclophosphamide  · digoxin  · furazolidone  · isoniazid  · nicotine  · orphenadrine  · procarbazine  · steroid medicines like prednisone or cortisone  · stimulant medicines for attention disorders, weight loss, or to stay awake  · tamoxifen  · theophylline  · thiotepa  · ticlopidine  · tramadol  · warfarin  This list may not describe all possible interactions. Give your health care provider a list of all the medicines, herbs, non-prescription drugs, or dietary supplements you use. Also tell them if you smoke, drink alcohol, or use illegal drugs. Some items may interact with your medicine.  What should I watch for while using this medicine?  Tell your doctor if your symptoms do not get better or if they get worse. Visit your doctor or healthcare provider for regular checks on your progress. Because it may take several weeks to  see the full effects of this medicine, it is important to continue your treatment as prescribed by your doctor.  This medicine may cause serious skin reactions. They can happen weeks to months after starting the medicine. Contact your healthcare provider right away if you notice fevers or flu-like symptoms with a rash. The rash may be red or purple and then turn into blisters or peeling of the skin. Or, you might notice a red rash with swelling of the face, lips or lymph nodes in your neck or under your arms.  Patients and their families should watch out for new or worsening thoughts of suicide or depression. Also watch out for sudden changes in feelings such as feeling anxious, agitated, panicky, irritable, hostile, aggressive, impulsive, severely restless, overly excited and hyperactive, or not being able to sleep. If this happens, especially at the beginning of treatment or after a change in dose, call your healthcare provider.  Avoid alcoholic drinks while taking this medicine. Drinking large amounts of alcoholic beverages, using sleeping or anxiety medicines, or quickly stopping the use of these agents while taking this medicine may increase your risk for a seizure.  Do not drive or use heavy machinery until you know how this medicine affects you. This medicine can impair your ability to perform these tasks.  Do not take this medicine close to bedtime. It may prevent you from sleeping.  Your mouth may get dry. Chewing sugarless gum or sucking hard candy, and drinking plenty of water may help. Contact your doctor if the problem does not go away or is severe.  The tablet shell for some brands of this medicine does not dissolve. This is normal. The tablet shell may appear whole in the stool. This is not a cause for concern.  What side effects may I notice from receiving this medicine?  Side effects that you should report to your doctor or health care professional as soon as possible:  · allergic reactions like  skin rash, itching or hives, swelling of the face, lips, or tongue  · breathing problems  · changes in vision  · confusion  · elevated mood, decreased need for sleep, racing thoughts, impulsive behavior  · fast or irregular heartbeat  · hallucinations, loss of contact with reality  · increased blood pressure  · rash, fever, and swollen lymph nodes  · redness, blistering, peeling or loosening of the skin, including inside the mouth  · seizures  · suicidal thoughts or other mood changes  · unusually weak or tired  · vomiting  Side effects that usually do not require medical attention (report to your doctor or health care professional if they continue or are bothersome):  · constipation  · headache  · loss of appetite  · nausea  · tremors  · weight loss  This list may not describe all possible side effects. Call your doctor for medical advice about side effects. You may report side effects to FDA at 6-199-FDA-1792.  Where should I keep my medicine?  Keep out of the reach of children.  Store at room temperature between 15 and 30 degrees C (59 and 86 degrees F). Throw away any unused medicine after the expiration date.  NOTE: This sheet is a summary. It may not cover all possible information. If you have questions about this medicine, talk to your doctor, pharmacist, or health care provider.  © 2020 Elsevier/Gold Standard (2020-03-12 13:45:31)

## 2021-06-16 NOTE — PROGRESS NOTES
Dwayne Jackson is a pleasant 35 y.o. male here for follow-up    HPI:    Major depressive disorder  Patient reports that since starting the 100 mg dose of the Zoloft he has noticed a vast improvement in his anxiety and some improvement in his depression.  Patient would like to stay on this medication however he has noted some sexual side effects that if starting to cause difficulties in his marriage.  Patient was wondering if there is any way that we can go ahead and change the medication to something with less side effects.      Health Maintenance  Patient is up-to-date with all of his health maintenance.    Current Medicines (including changes today)  Current Outpatient Medications   Medication Sig Dispense Refill   • escitalopram (LEXAPRO) 20 MG tablet Take 1 tablet by mouth every day. 30 tablet 11   • lisinopril (PRINIVIL) 10 MG Tab TAKE 1 TABLET BY MOUTH EVERY DAY 30 tablet 0   • aspirin (ASA) 325 MG Tab Take 650 mg by mouth every 6 hours as needed (For chest pain).     • multivitamin (THERAGRAN) Tab Take 1 tablet by mouth 2 times a day.     • ibuprofen (MOTRIN) 200 MG Tab Take 400-600 mg by mouth every 6 hours as needed for Mild Pain.       No current facility-administered medications for this visit.     Past Medical/ Surgical History  He  has a past medical history of Hernia, inguinal.  He  has a past surgical history that includes hernia repair.    Review of Systems   Constitutional: Negative for chills, fever and weight loss.   HENT: Negative for hearing loss.    Eyes: Negative for blurred vision and double vision.   Respiratory: Negative for cough and shortness of breath.    Cardiovascular: Negative for chest pain, palpitations and leg swelling.   Gastrointestinal: Negative for constipation, diarrhea and nausea.   Genitourinary: Negative.    Musculoskeletal: Negative for myalgias.   Skin: Negative for rash.   Neurological: Negative for dizziness and weakness.   Endo/Heme/Allergies: Does not  "bruise/bleed easily.   Psychiatric/Behavioral: Negative for depression.         Objective:     /57 (BP Location: Left arm, Patient Position: Sitting, BP Cuff Size: Adult)   Pulse 82   Temp 36.6 °C (97.8 °F)   Resp 16   Ht 1.778 m (5' 10\")   Wt 90 kg (198 lb 6.6 oz)   SpO2 97%  Body mass index is 28.47 kg/m².    Physical Exam  Constitutional:       General: He is not in acute distress.  HENT:      Head: Normocephalic and atraumatic.   Eyes:      Conjunctiva/sclera: Conjunctivae normal.      Pupils: Pupils are equal, round, and reactive to light.   Pulmonary:      Effort: Pulmonary effort is normal. No respiratory distress.   Abdominal:      General: There is no distension.   Musculoskeletal:      Cervical back: Normal range of motion and neck supple.   Skin:     General: Skin is warm and dry.      Findings: No rash.   Neurological:      Mental Status: He is alert and oriented to person, place, and time.      Gait: Gait is intact.   Psychiatric:         Mood and Affect: Affect normal.        Imaging:  No imaging to review    Labs  No new blood work to review  Assessment and Plan:   The following treatment plan was discussed    1. Major depressive disorder, remission status unspecified, unspecified whether recurrent  Chronic, stable.  We will go ahead and stop the Zoloft and will start the patient on Lexapro starting tomorrow.  Patient provided with information regarding his new prescription.  I did inform the patient that if sexual side effects continue with his new prescription we will go ahead and add Wellbutrin low-dose.  - escitalopram (LEXAPRO) 20 MG tablet; Take 1 tablet by mouth every day.  Dispense: 30 tablet; Refill: 11      Followup: Return in about 4 weeks (around 7/14/2021) for Medication evaluation.      Please note that this dictation was created using voice recognition software. I have made every reasonable attempt to correct obvious errors, but I expect that there are errors of grammar and " possibly content that I did not discover before finalizing the note.

## 2021-06-16 NOTE — ASSESSMENT & PLAN NOTE
Patient reports that since starting the 100 mg dose of the Zoloft he has noticed a vast improvement in his anxiety and some improvement in his depression.  Patient would like to stay on this medication however he has noted some sexual side effects that if starting to cause difficulties in his marriage.  Patient was wondering if there is any way that we can go ahead and change the medication to something with less side effects.

## 2021-07-02 DIAGNOSIS — I10 ESSENTIAL HYPERTENSION: ICD-10-CM

## 2021-07-02 RX ORDER — LISINOPRIL 10 MG/1
10 TABLET ORAL DAILY
Qty: 30 TABLET | Refills: 0 | Status: SHIPPED | OUTPATIENT
Start: 2021-07-02 | End: 2021-07-28 | Stop reason: SDUPTHER

## 2021-07-02 NOTE — TELEPHONE ENCOUNTER
Received request via: Patient    Was the patient seen in the last year in this department? Yes    Does the patient have an active prescription (recently filled or refills available) for medication(s) requested? No   Requested Prescriptions     Pending Prescriptions Disp Refills   • lisinopril (PRINIVIL) 10 MG Tab 30 tablet 0     Sig: Take 1 tablet by mouth every day.

## 2021-07-23 ENCOUNTER — APPOINTMENT (OUTPATIENT)
Dept: MEDICAL GROUP | Facility: MEDICAL CENTER | Age: 36
End: 2021-07-23
Payer: COMMERCIAL

## 2021-07-28 ENCOUNTER — OFFICE VISIT (OUTPATIENT)
Dept: MEDICAL GROUP | Facility: MEDICAL CENTER | Age: 36
End: 2021-07-28
Payer: COMMERCIAL

## 2021-07-28 VITALS
DIASTOLIC BLOOD PRESSURE: 70 MMHG | HEIGHT: 70 IN | BODY MASS INDEX: 28.75 KG/M2 | HEART RATE: 112 BPM | OXYGEN SATURATION: 98 % | SYSTOLIC BLOOD PRESSURE: 118 MMHG | WEIGHT: 200.84 LBS | TEMPERATURE: 95.6 F

## 2021-07-28 DIAGNOSIS — I10 ESSENTIAL HYPERTENSION: ICD-10-CM

## 2021-07-28 DIAGNOSIS — F32.9 MAJOR DEPRESSIVE DISORDER, REMISSION STATUS UNSPECIFIED, UNSPECIFIED WHETHER RECURRENT: ICD-10-CM

## 2021-07-28 DIAGNOSIS — E66.9 OBESITY (BMI 30-39.9): ICD-10-CM

## 2021-07-28 PROCEDURE — 99214 OFFICE O/P EST MOD 30 MIN: CPT | Performed by: FAMILY MEDICINE

## 2021-07-28 RX ORDER — LISINOPRIL 10 MG/1
10 TABLET ORAL DAILY
Qty: 90 TABLET | Refills: 3 | Status: SHIPPED | OUTPATIENT
Start: 2021-07-28 | End: 2022-07-06

## 2021-07-28 ASSESSMENT — ENCOUNTER SYMPTOMS
PALPITATIONS: 0
WEIGHT LOSS: 0
FEVER: 0
DIZZINESS: 0
MYALGIAS: 0
WEAKNESS: 0
CHILLS: 0
COUGH: 0
DEPRESSION: 0
SHORTNESS OF BREATH: 0
ABDOMINAL PAIN: 0

## 2021-07-28 ASSESSMENT — FIBROSIS 4 INDEX: FIB4 SCORE: 0.41

## 2021-07-28 NOTE — ASSESSMENT & PLAN NOTE
Patient continues his Lexapro 20 mg daily and has been feeling substantially better.  He reports that he has been more active and does not spend time moving around the house.  He does state that he continues with the anxiety but he is able to manage his anxiety levels now.  He has been able to get himself out of position.  He is able to take some vacation time and is planning on doing so starting this evening.

## 2021-07-28 NOTE — PROGRESS NOTES
Dwayne Jackson is a pleasant 35 y.o. male here for   Chief Complaint   Patient presents with   • Follow-Up   • Medication Refill      HPI:   Obesity (BMI 30-39.9)  Down 20 lbs after making dietary improvements as well as increasing in his physical activity.  He overall feels much better and happier since starting the medication for his depression.      Essential hypertension  Patient reports he continues his lisinopril 10 mg daily as it is helping his overall blood pressure goals.  The clinic his blood pressure is 118/70.  Patient denies any blood pressures that have gone above 140/90.  Does state that he will occasionally get some lightheadedness but that is often rare.    Major depressive disorder  Patient continues his Lexapro 20 mg daily and has been feeling substantially better.  He reports that he has been more active and does not spend time moving around the house.  He does state that he continues with the anxiety but he is able to manage his anxiety levels now.  He has been able to get himself out of position.  He is able to take some vacation time and is planning on doing so starting this evening.      Health Maintenance  Up-to-date with all of his health maintenance    Current Medicines (including changes today)  Current Outpatient Medications   Medication Sig Dispense Refill   • lisinopril (PRINIVIL) 10 MG Tab Take 1 tablet by mouth every day. 90 tablet 3   • escitalopram (LEXAPRO) 20 MG tablet Take 1 tablet by mouth every day. 30 tablet 11   • aspirin (ASA) 325 MG Tab Take 650 mg by mouth every 6 hours as needed (For chest pain).     • multivitamin (THERAGRAN) Tab Take 1 tablet by mouth 2 times a day.     • ibuprofen (MOTRIN) 200 MG Tab Take 400-600 mg by mouth every 6 hours as needed for Mild Pain.       No current facility-administered medications for this visit.     Past Medical/ Surgical History  He  has a past medical history of Hernia, inguinal.  He  has a past surgical history that includes  "hernia repair.    Review of Systems   Constitutional: Negative for chills, fever, malaise/fatigue and weight loss.   Respiratory: Negative for cough and shortness of breath.    Cardiovascular: Negative for chest pain and palpitations.   Gastrointestinal: Negative for abdominal pain.   Genitourinary: Negative.    Musculoskeletal: Negative for myalgias.   Skin: Negative for rash.   Neurological: Negative for dizziness and weakness.   Psychiatric/Behavioral: Negative for depression.         Objective:     /70 (BP Location: Left arm, Patient Position: Sitting, BP Cuff Size: Large adult)   Pulse (!) 112   Temp (!) 35.3 °C (95.6 °F) (Temporal)   Ht 1.778 m (5' 10\")   Wt 91.1 kg (200 lb 13.4 oz)   SpO2 98%  Body mass index is 28.82 kg/m².    Physical Exam  Constitutional:       General: He is not in acute distress.  HENT:      Head: Normocephalic and atraumatic.   Eyes:      Conjunctiva/sclera: Conjunctivae normal.      Pupils: Pupils are equal, round, and reactive to light.   Pulmonary:      Effort: Pulmonary effort is normal. No respiratory distress.   Abdominal:      General: There is no distension.   Musculoskeletal:      Cervical back: Normal range of motion and neck supple.   Skin:     General: Skin is warm and dry.      Findings: No rash.   Neurological:      Mental Status: He is alert and oriented to person, place, and time.      Gait: Gait is intact.   Psychiatric:         Mood and Affect: Affect normal.        Imaging:  No recent imaging to review    Labs  No recent labs to review  Assessment and Plan:   The following treatment plan was discussed    1. Essential hypertension  Chronic, stable.  I did recommend that if the patient's blood pressure continues to be under 140/90 and if he continues to experience the dizziness he is okay to go ahead and stop the lisinopril but to continue to monitor his blood pressures at home.  I feel as though with his 20 pound weight loss and improved mood his blood " pressures will stabilize and will no longer need to be on the lisinopril.  He did request a new prescription sent to his pharmacy and this was provided.  - lisinopril (PRINIVIL) 10 MG Tab; Take 1 tablet by mouth every day.  Dispense: 90 tablet; Refill: 3    2. Obesity (BMI 30-39.9)  Chronic, stable.  I further encouraged patient to continue making these dietary changes as well as increasing physical activity.  I feel that an improvement in his mood is the overall driving force to making these positive lifestyle changes.    3. Major depressive disorder, remission status unspecified, unspecified whether recurrent  Chronic, stable.  I recommended that the patient continue taking the Lexapro and following up with psychologist at the end of the August.  We did discuss that if his mood continues to be stable that after several months we may attempt to titrate him off this medication.      Followup: Return in about 1 year (around 7/28/2022).    Please note that this dictation was created using voice recognition software. I have made every reasonable attempt to correct obvious errors, but I expect that there are errors of grammar and possibly content that I did not discover before finalizing the note.

## 2021-07-28 NOTE — ASSESSMENT & PLAN NOTE
Patient reports he continues his lisinopril 10 mg daily as it is helping his overall blood pressure goals.  The clinic his blood pressure is 118/70.  Patient denies any blood pressures that have gone above 140/90.  Does state that he will occasionally get some lightheadedness but that is often rare.

## 2021-07-28 NOTE — ASSESSMENT & PLAN NOTE
Down 20 lbs after making dietary improvements as well as increasing in his physical activity.  He overall feels much better and happier since starting the medication for his depression.

## 2021-09-07 ENCOUNTER — TELEMEDICINE (OUTPATIENT)
Dept: BEHAVIORAL HEALTH | Facility: CLINIC | Age: 36
End: 2021-09-07
Payer: COMMERCIAL

## 2021-09-07 VITALS — WEIGHT: 208 LBS | HEIGHT: 70 IN | BODY MASS INDEX: 29.78 KG/M2

## 2021-09-07 DIAGNOSIS — F43.9 TRAUMA AND STRESSOR-RELATED DISORDER: ICD-10-CM

## 2021-09-07 DIAGNOSIS — F33.41 MDD (MAJOR DEPRESSIVE DISORDER), RECURRENT, IN PARTIAL REMISSION (HCC): ICD-10-CM

## 2021-09-07 DIAGNOSIS — F41.1 GAD (GENERALIZED ANXIETY DISORDER): ICD-10-CM

## 2021-09-07 PROCEDURE — 99205 OFFICE O/P NEW HI 60 MIN: CPT | Mod: 95 | Performed by: PSYCHIATRY & NEUROLOGY

## 2021-09-07 ASSESSMENT — FIBROSIS 4 INDEX: FIB4 SCORE: 0.41
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
## 2021-12-07 ENCOUNTER — TELEMEDICINE (OUTPATIENT)
Dept: BEHAVIORAL HEALTH | Facility: CLINIC | Age: 36
End: 2021-12-07
Payer: COMMERCIAL

## 2021-12-07 VITALS — BODY MASS INDEX: 29.84 KG/M2 | HEIGHT: 70 IN

## 2021-12-07 DIAGNOSIS — F43.9 TRAUMA AND STRESSOR-RELATED DISORDER: ICD-10-CM

## 2021-12-07 DIAGNOSIS — F41.1 GAD (GENERALIZED ANXIETY DISORDER): ICD-10-CM

## 2021-12-07 DIAGNOSIS — F33.0 MDD (MAJOR DEPRESSIVE DISORDER), RECURRENT EPISODE, MILD (HCC): ICD-10-CM

## 2021-12-07 PROCEDURE — 90833 PSYTX W PT W E/M 30 MIN: CPT | Mod: 95 | Performed by: PSYCHIATRY & NEUROLOGY

## 2021-12-07 PROCEDURE — 99212 OFFICE O/P EST SF 10 MIN: CPT | Mod: 95 | Performed by: PSYCHIATRY & NEUROLOGY
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
## 2022-02-04 ENCOUNTER — APPOINTMENT (OUTPATIENT)
Dept: BEHAVIORAL HEALTH | Facility: CLINIC | Age: 37
End: 2022-02-04
Payer: COMMERCIAL

## 2022-02-15 ENCOUNTER — APPOINTMENT (OUTPATIENT)
Dept: BEHAVIORAL HEALTH | Facility: CLINIC | Age: 37
End: 2022-02-15
Payer: COMMERCIAL

## 2022-07-06 ENCOUNTER — OFFICE VISIT (OUTPATIENT)
Dept: URGENT CARE | Facility: CLINIC | Age: 37
End: 2022-07-06
Payer: COMMERCIAL

## 2022-07-06 VITALS
SYSTOLIC BLOOD PRESSURE: 110 MMHG | OXYGEN SATURATION: 95 % | RESPIRATION RATE: 16 BRPM | WEIGHT: 200 LBS | TEMPERATURE: 97.8 F | DIASTOLIC BLOOD PRESSURE: 68 MMHG | HEIGHT: 66 IN | HEART RATE: 96 BPM | BODY MASS INDEX: 32.14 KG/M2

## 2022-07-06 DIAGNOSIS — A08.4 VIRAL GASTROENTERITIS: ICD-10-CM

## 2022-07-06 LAB
EXTERNAL QUALITY CONTROL: NORMAL
SARS-COV+SARS-COV-2 AG RESP QL IA.RAPID: NEGATIVE

## 2022-07-06 PROCEDURE — 87426 SARSCOV CORONAVIRUS AG IA: CPT | Performed by: NURSE PRACTITIONER

## 2022-07-06 PROCEDURE — 99213 OFFICE O/P EST LOW 20 MIN: CPT | Mod: CS | Performed by: NURSE PRACTITIONER

## 2022-07-06 RX ORDER — ONDANSETRON 4 MG/1
4 TABLET, ORALLY DISINTEGRATING ORAL EVERY 6 HOURS PRN
Qty: 10 TABLET | Refills: 0 | Status: SHIPPED | OUTPATIENT
Start: 2022-07-06

## 2022-07-06 ASSESSMENT — ENCOUNTER SYMPTOMS
HEARTBURN: 1
COUGH: 0
FEVER: 0
BLOOD IN STOOL: 0
DIARRHEA: 1
ABDOMINAL PAIN: 1
SORE THROAT: 1
VOMITING: 1

## 2022-07-06 ASSESSMENT — FIBROSIS 4 INDEX: FIB4 SCORE: 0.43

## 2022-07-06 NOTE — LETTER
July 6, 2022         Patient: Dwayne Jackson   YOB: 1985   Date of Visit: 7/6/2022           To Whom it May Concern:    Dwayne Jackson was seen in my clinic on 7/6/2022. He may return to work on 7/8/2022; as long as COVID testing is negative.    If you have any questions or concerns, please don't hesitate to call.        Sincerely,           MARTI Pedro  Electronically Signed

## 2022-07-06 NOTE — PROGRESS NOTES
Subjective:     Dwayne Jackson is a 36 y.o. male who presents for Diarrhea (X 2 days, diarrhea, yesterday vomiting, stomach pain, not eating well. Needs a work note.)      Started yesterday morning, had been to [place] the night before. Denies excess alcohol consumption, stating he does not really drink. Sore throat with vomiting yesterday. No vomiting today. Diarrhea x 4 today. No blood in stools. Tolerating fluids. Lower abdominal pain, stating he has pain prior to having BM. No known COVID exposure. Needs a work note, as he missed 2 days after a holiday. Scheduled back to work Friday.    Diarrhea   This is a new problem. The current episode started in the past 7 days. Associated symptoms include abdominal pain and vomiting. Pertinent negatives include no coughing, fever or URI. He has tried bismuth subsalicylate for the symptoms. There is no history of inflammatory bowel disease or a recent abdominal surgery.       Past Medical History:   Diagnosis Date   • Hernia, inguinal     left, corrected       Past Surgical History:   Procedure Laterality Date   • HERNIA REPAIR      left inguinal       Social History     Socioeconomic History   • Marital status:      Spouse name: Not on file   • Number of children: Not on file   • Years of education: Not on file   • Highest education level: Not on file   Occupational History   • Not on file   Tobacco Use   • Smoking status: Never Smoker   • Smokeless tobacco: Never Used   Vaping Use   • Vaping Use: Former   • Substances: THC   Substance and Sexual Activity   • Alcohol use: Yes     Comment: once a month   • Drug use: Yes     Types: Marijuana, Inhaled     Comment: Marijuana, 1-2 times/week   • Sexual activity: Yes     Partners: Female     Comment:    Other Topics Concern   • Not on file   Social History Narrative   • Not on file     Social Determinants of Health     Financial Resource Strain: Not on file   Food Insecurity: Not on file   Transportation  "Needs: Not on file   Physical Activity: Not on file   Stress: Not on file   Social Connections: Not on file   Intimate Partner Violence: Not on file   Housing Stability: Not on file        Family History   Problem Relation Age of Onset   • Hypertension Father    • Hyperlipidemia Father    • Other Father         high iron levels   • No Known Problems Brother    • Cancer Paternal Grandmother         leukemia   • No Known Problems Brother         Allergies   Allergen Reactions   • Other Food Itching     Mushrooms, pt reports that he gets itchy    • Molds & Smuts Itching     Itchy throat swollen ears         Review of Systems   Constitutional: Negative for fever.   HENT: Positive for sore throat.    Respiratory: Negative for cough.    Gastrointestinal: Positive for abdominal pain, diarrhea, heartburn and vomiting. Negative for blood in stool.   All other systems reviewed and are negative.       Objective:   /68   Pulse 96   Temp 36.6 °C (97.8 °F) (Temporal)   Resp 16   Ht 1.676 m (5' 6\")   Wt 90.7 kg (200 lb)   SpO2 95%   BMI 32.28 kg/m²     Physical Exam  Vitals reviewed.   Constitutional:       General: He is not in acute distress.     Appearance: He is well-developed. He is not toxic-appearing.   HENT:      Head: Normocephalic and atraumatic.      Right Ear: External ear normal.      Left Ear: External ear normal.      Nose: Nose normal.      Mouth/Throat:      Mouth: Mucous membranes are moist.      Pharynx: Oropharynx is clear.   Eyes:      Conjunctiva/sclera: Conjunctivae normal.   Cardiovascular:      Rate and Rhythm: Normal rate.   Pulmonary:      Effort: Pulmonary effort is normal. No respiratory distress.   Abdominal:      General: Bowel sounds are increased. There is no distension.      Palpations: Abdomen is soft.      Tenderness: There is abdominal tenderness in the right lower quadrant, suprapubic area and left lower quadrant.      Comments: States he's tender only because he has to have a BM. "    Musculoskeletal:         General: Normal range of motion.   Skin:     General: Skin is warm and dry.      Findings: No rash.   Neurological:      General: No focal deficit present.      Mental Status: He is alert and oriented to person, place, and time.      GCS: GCS eye subscore is 4. GCS verbal subscore is 5. GCS motor subscore is 6.   Psychiatric:         Mood and Affect: Mood normal.         Speech: Speech normal.         Behavior: Behavior normal.         Thought Content: Thought content normal.         Judgment: Judgment normal.         Assessment/Plan:   1. Viral gastroenteritis  -Increase fluids.  -Rest.  -Advance diet as tolerated starting with bland, low fat meals. Boiled starches and cereals with salt are indicated in patients with watery diarrhea; crackers, bananas, soup, and boiled vegetables may also be consumed.   -Avoid alcohol, coffee, nicotine and spicy foods.  -Over the counter antidiarrheals.  -Probiotics  -Follow up with PCP or clinic in 3 days if not feeling better.    -Follow up emergently for more than 6 watery stools in a 24 hour period, blood or mucus in stool, fever greater than 101.2, inability to tolerated fluids, signs of dehydration, weakness, elevated heart rate, increased or persistent abdominal pain.    States he's tender only because he has to have a BM. Would rather wait to have BM at home. Given ED precautions.     Differential diagnosis, natural history, supportive care, and indications for immediate follow-up discussed. Stable vitals.

## 2022-07-06 NOTE — PATIENT INSTRUCTIONS
-Increase fluids.  -Rest.  -Advance diet as tolerated starting with bland, low fat meals. Boiled starches and cereals with salt are indicated in patients with watery diarrhea; crackers, bananas, soup, and boiled vegetables may also be consumed.   -Avoid alcohol, coffee, nicotine and spicy foods.  -Over the counter antidiarrheals.  -Probiotics  -Follow up with PCP or clinic in 3 days if not feeling better.    -Follow up emergently for more than 6 watery stools in a 24 hour period, blood or mucus in stool, fever greater than 101.2, inability to tolerated fluids, signs of dehydration, weakness, elevated heart rate, increased or persistent abdominal pain.